# Patient Record
Sex: MALE | Race: WHITE | NOT HISPANIC OR LATINO | URBAN - METROPOLITAN AREA
[De-identification: names, ages, dates, MRNs, and addresses within clinical notes are randomized per-mention and may not be internally consistent; named-entity substitution may affect disease eponyms.]

---

## 2019-08-20 ENCOUNTER — EMERGENCY (EMERGENCY)
Facility: HOSPITAL | Age: 54
LOS: 0 days | Discharge: HOME | End: 2019-08-20
Attending: EMERGENCY MEDICINE | Admitting: EMERGENCY MEDICINE
Payer: COMMERCIAL

## 2019-08-20 VITALS
OXYGEN SATURATION: 98 % | SYSTOLIC BLOOD PRESSURE: 130 MMHG | HEART RATE: 97 BPM | TEMPERATURE: 98 F | DIASTOLIC BLOOD PRESSURE: 77 MMHG

## 2019-08-20 DIAGNOSIS — L03.116 CELLULITIS OF LEFT LOWER LIMB: ICD-10-CM

## 2019-08-20 DIAGNOSIS — R50.9 FEVER, UNSPECIFIED: ICD-10-CM

## 2019-08-20 DIAGNOSIS — M79.89 OTHER SPECIFIED SOFT TISSUE DISORDERS: ICD-10-CM

## 2019-08-20 LAB
ALBUMIN SERPL ELPH-MCNC: 4 G/DL — SIGNIFICANT CHANGE UP (ref 3.5–5.2)
ALP SERPL-CCNC: 72 U/L — SIGNIFICANT CHANGE UP (ref 30–115)
ALT FLD-CCNC: 22 U/L — SIGNIFICANT CHANGE UP (ref 0–41)
ANION GAP SERPL CALC-SCNC: 14 MMOL/L — SIGNIFICANT CHANGE UP (ref 7–14)
APPEARANCE UR: CLEAR — SIGNIFICANT CHANGE UP
AST SERPL-CCNC: 26 U/L — SIGNIFICANT CHANGE UP (ref 0–41)
BACTERIA # UR AUTO: ABNORMAL
BASOPHILS # BLD AUTO: 0.04 K/UL — SIGNIFICANT CHANGE UP (ref 0–0.2)
BASOPHILS NFR BLD AUTO: 0.4 % — SIGNIFICANT CHANGE UP (ref 0–1)
BILIRUB SERPL-MCNC: 0.7 MG/DL — SIGNIFICANT CHANGE UP (ref 0.2–1.2)
BILIRUB UR-MCNC: NEGATIVE — SIGNIFICANT CHANGE UP
BUN SERPL-MCNC: 15 MG/DL — SIGNIFICANT CHANGE UP (ref 10–20)
CALCIUM SERPL-MCNC: 8.9 MG/DL — SIGNIFICANT CHANGE UP (ref 8.5–10.1)
CHLORIDE SERPL-SCNC: 100 MMOL/L — SIGNIFICANT CHANGE UP (ref 98–110)
CO2 SERPL-SCNC: 21 MMOL/L — SIGNIFICANT CHANGE UP (ref 17–32)
COLOR SPEC: YELLOW — SIGNIFICANT CHANGE UP
CREAT SERPL-MCNC: 0.9 MG/DL — SIGNIFICANT CHANGE UP (ref 0.7–1.5)
DIFF PNL FLD: ABNORMAL
EOSINOPHIL # BLD AUTO: 0.09 K/UL — SIGNIFICANT CHANGE UP (ref 0–0.7)
EOSINOPHIL NFR BLD AUTO: 0.9 % — SIGNIFICANT CHANGE UP (ref 0–8)
EPI CELLS # UR: 1 /HPF — SIGNIFICANT CHANGE UP (ref 0–5)
GLUCOSE SERPL-MCNC: 276 MG/DL — HIGH (ref 70–99)
GLUCOSE UR QL: ABNORMAL
HCT VFR BLD CALC: 41.8 % — LOW (ref 42–52)
HGB BLD-MCNC: 14 G/DL — SIGNIFICANT CHANGE UP (ref 14–18)
HYALINE CASTS # UR AUTO: 0 /LPF — SIGNIFICANT CHANGE UP (ref 0–7)
IMM GRANULOCYTES NFR BLD AUTO: 0.5 % — HIGH (ref 0.1–0.3)
KETONES UR-MCNC: SIGNIFICANT CHANGE UP
LACTATE SERPL-SCNC: 1.4 MMOL/L — SIGNIFICANT CHANGE UP (ref 0.5–2.2)
LEUKOCYTE ESTERASE UR-ACNC: ABNORMAL
LYMPHOCYTES # BLD AUTO: 1.09 K/UL — LOW (ref 1.2–3.4)
LYMPHOCYTES # BLD AUTO: 10.5 % — LOW (ref 20.5–51.1)
MCHC RBC-ENTMCNC: 30.1 PG — SIGNIFICANT CHANGE UP (ref 27–31)
MCHC RBC-ENTMCNC: 33.5 G/DL — SIGNIFICANT CHANGE UP (ref 32–37)
MCV RBC AUTO: 89.9 FL — SIGNIFICANT CHANGE UP (ref 80–94)
MONOCYTES # BLD AUTO: 0.91 K/UL — HIGH (ref 0.1–0.6)
MONOCYTES NFR BLD AUTO: 8.7 % — SIGNIFICANT CHANGE UP (ref 1.7–9.3)
NEUTROPHILS # BLD AUTO: 8.24 K/UL — HIGH (ref 1.4–6.5)
NEUTROPHILS NFR BLD AUTO: 79 % — HIGH (ref 42.2–75.2)
NITRITE UR-MCNC: NEGATIVE — SIGNIFICANT CHANGE UP
NRBC # BLD: 0 /100 WBCS — SIGNIFICANT CHANGE UP (ref 0–0)
PH UR: 6.5 — SIGNIFICANT CHANGE UP (ref 5–8)
PLATELET # BLD AUTO: 150 K/UL — SIGNIFICANT CHANGE UP (ref 130–400)
POTASSIUM SERPL-MCNC: 5.2 MMOL/L — HIGH (ref 3.5–5)
POTASSIUM SERPL-SCNC: 5.2 MMOL/L — HIGH (ref 3.5–5)
PROT SERPL-MCNC: 7.1 G/DL — SIGNIFICANT CHANGE UP (ref 6–8)
PROT UR-MCNC: ABNORMAL
RBC # BLD: 4.65 M/UL — LOW (ref 4.7–6.1)
RBC # FLD: 12.8 % — SIGNIFICANT CHANGE UP (ref 11.5–14.5)
RBC CASTS # UR COMP ASSIST: 9 /HPF — HIGH (ref 0–4)
SODIUM SERPL-SCNC: 135 MMOL/L — SIGNIFICANT CHANGE UP (ref 135–146)
SP GR SPEC: 1.03 — HIGH (ref 1.01–1.02)
UROBILINOGEN FLD QL: SIGNIFICANT CHANGE UP
WBC # BLD: 10.42 K/UL — SIGNIFICANT CHANGE UP (ref 4.8–10.8)
WBC # FLD AUTO: 10.42 K/UL — SIGNIFICANT CHANGE UP (ref 4.8–10.8)
WBC UR QL: 70 /HPF — HIGH (ref 0–5)

## 2019-08-20 PROCEDURE — 99284 EMERGENCY DEPT VISIT MOD MDM: CPT

## 2019-08-20 PROCEDURE — 73590 X-RAY EXAM OF LOWER LEG: CPT | Mod: 26,LT

## 2019-08-20 RX ORDER — CEPHALEXIN 500 MG
1 CAPSULE ORAL
Qty: 40 | Refills: 0
Start: 2019-08-20 | End: 2019-08-29

## 2019-08-20 RX ORDER — PIPERACILLIN AND TAZOBACTAM 4; .5 G/20ML; G/20ML
4.5 INJECTION, POWDER, LYOPHILIZED, FOR SOLUTION INTRAVENOUS ONCE
Refills: 0 | Status: COMPLETED | OUTPATIENT
Start: 2019-08-20 | End: 2019-08-20

## 2019-08-20 RX ADMIN — PIPERACILLIN AND TAZOBACTAM 25 GRAM(S): 4; .5 INJECTION, POWDER, LYOPHILIZED, FOR SOLUTION INTRAVENOUS at 12:03

## 2019-08-20 NOTE — ED PROVIDER NOTE - ATTENDING CONTRIBUTION TO CARE
52 yo M with PMH of DM presents to Ed for L erythematous calf since Sunday. Patient states it is painful and erythematous. He had this once in the past when he had cellulitis. Subjective fever yesterday but no fever or chills today. No swelling. Patient able to ambulate.     On PE patient has erythematous L calf. No gas or crepitus felt. Patient has DP and TP pulses. No swelling. No fever.     Will do basic labs and xray and give antibiotics.

## 2019-08-20 NOTE — ED PROVIDER NOTE - CLINICAL SUMMARY MEDICAL DECISION MAKING FREE TEXT BOX
Cellulitis    Cellulitis is a skin infection caused by bacteria. This condition occurs most often in the arms and lower legs but can occur anywhere over the body. Symptoms include redness, swelling, warm skin, tenderness, and chills/fever. If you were prescribed an antibiotic medicine, take it as told by your health care provider. Do not stop taking the antibiotic even if you start to feel better.    SEEK IMMEDIATE MEDICAL CARE IF YOU HAVE ANY OF THE FOLLOWING SYMPTOMS: worsening fever, red streaks coming from affected area, vomiting or diarrhea, or dizziness/lightheadedness. 52 yo M with DM presents to ED for L calf erythema concerning for cellulitis. Patient's labs and vitals are WNL. No fever. No crepitus. xrya normal. Patient able to ambulate .  DC home with PO antibiotics and strict return precautions. Patient understands and will fu with PCP unless his leg worsens- erythema spreads, he develops fever or chills.

## 2019-08-20 NOTE — ED PROVIDER NOTE - OBJECTIVE STATEMENT
pt is a 52 yo with hx of diabetes, chronic venous stasis x 10 years,  presenting with pain and swelling since Sunday .   Per patient on sunday he first noticed a small area of redness and pain on his lower left calf.  It has continued to progress and when he woke up today he noticed the entire leg was red , warm,  painful to touch and mildly swollen.  He is also reporting a subjective fever, 101 on home thermometer . Took Advil at 10pm last night. Of note has had a previous episode of cellulitis " many years ago " and took PO antibiotics at home.  Afebrile on arrival today.

## 2019-08-20 NOTE — ED PROVIDER NOTE - PHYSICAL EXAMINATION
HEAD:  normocephalic, atraumatic  EYES:  conjunctivae without injection, drainage or discharge  ENMT:  tympanic membranes pearly gray with normal landmarks; nasal mucosa moist; mouth moist without ulcerations or lesions; throat moist without erythema, exudate, ulcerations or lesions  NECK:  supple, no masses, no significant lymphadenopathy  CARDIAC:  regular rate and rhythm, normal S1 and S2, no murmurs, rubs or gallops  RESP:  respiratory rate and effort appear normal for age; lungs are clear to auscultation bilaterally; no rales or wheezes  ABDOMEN:  soft, nontender, nondistended, no masses, no organomegaly  LYMPHATICS:  no significant lymphadenopathy  MUSCULOSKELETAL/NEURO:  normal movement, normal tone  SKIN:

## 2019-08-20 NOTE — ED PROVIDER NOTE - NS ED ROS FT
Constitutional: subjective fever at home  Eyes/ENT: (-) blurry vision  Cardiovascular: (-) chest pain,  Respiratory:  (-) shortness of breath  Gastrointestinal: (-) vomiting, (-) diarrhea, (-) abdominal pain   Musculoskeletal: (-) neck pain, (-) back pain, (-) joint pain  Integumentary: (-) rash, (-) edema  Neurological: (-) headache, (-) altered mental status  Psychiatric: (-) hallucinations  Allergic/Immunologic: (-) pruritus around site

## 2019-08-21 RX ORDER — CEPHALEXIN 500 MG
1 CAPSULE ORAL
Qty: 40 | Refills: 0
Start: 2019-08-21 | End: 2019-08-30

## 2019-08-25 ENCOUNTER — INPATIENT (INPATIENT)
Facility: HOSPITAL | Age: 54
LOS: 2 days | Discharge: HOME | End: 2019-08-28
Attending: INTERNAL MEDICINE | Admitting: INTERNAL MEDICINE
Payer: COMMERCIAL

## 2019-08-25 VITALS
RESPIRATION RATE: 16 BRPM | DIASTOLIC BLOOD PRESSURE: 60 MMHG | SYSTOLIC BLOOD PRESSURE: 122 MMHG | OXYGEN SATURATION: 97 % | TEMPERATURE: 98 F | WEIGHT: 315 LBS | HEART RATE: 98 BPM | HEIGHT: 73 IN

## 2019-08-25 LAB
ALBUMIN SERPL ELPH-MCNC: 4.3 G/DL — SIGNIFICANT CHANGE UP (ref 3.5–5.2)
ALP SERPL-CCNC: 69 U/L — SIGNIFICANT CHANGE UP (ref 30–115)
ALT FLD-CCNC: 37 U/L — SIGNIFICANT CHANGE UP (ref 0–41)
ANION GAP SERPL CALC-SCNC: 13 MMOL/L — SIGNIFICANT CHANGE UP (ref 7–14)
AST SERPL-CCNC: 25 U/L — SIGNIFICANT CHANGE UP (ref 0–41)
BASOPHILS # BLD AUTO: 0.04 K/UL — SIGNIFICANT CHANGE UP (ref 0–0.2)
BASOPHILS NFR BLD AUTO: 0.4 % — SIGNIFICANT CHANGE UP (ref 0–1)
BILIRUB SERPL-MCNC: 0.3 MG/DL — SIGNIFICANT CHANGE UP (ref 0.2–1.2)
BUN SERPL-MCNC: 24 MG/DL — HIGH (ref 10–20)
CALCIUM SERPL-MCNC: 9.4 MG/DL — SIGNIFICANT CHANGE UP (ref 8.5–10.1)
CHLORIDE SERPL-SCNC: 99 MMOL/L — SIGNIFICANT CHANGE UP (ref 98–110)
CO2 SERPL-SCNC: 25 MMOL/L — SIGNIFICANT CHANGE UP (ref 17–32)
CREAT SERPL-MCNC: 1.1 MG/DL — SIGNIFICANT CHANGE UP (ref 0.7–1.5)
CULTURE RESULTS: SIGNIFICANT CHANGE UP
CULTURE RESULTS: SIGNIFICANT CHANGE UP
EOSINOPHIL # BLD AUTO: 0.27 K/UL — SIGNIFICANT CHANGE UP (ref 0–0.7)
EOSINOPHIL NFR BLD AUTO: 2.7 % — SIGNIFICANT CHANGE UP (ref 0–8)
GLUCOSE BLDC GLUCOMTR-MCNC: 146 MG/DL — HIGH (ref 70–99)
GLUCOSE BLDC GLUCOMTR-MCNC: 162 MG/DL — HIGH (ref 70–99)
GLUCOSE SERPL-MCNC: 200 MG/DL — HIGH (ref 70–99)
HCT VFR BLD CALC: 43.1 % — SIGNIFICANT CHANGE UP (ref 42–52)
HGB BLD-MCNC: 14.2 G/DL — SIGNIFICANT CHANGE UP (ref 14–18)
IMM GRANULOCYTES NFR BLD AUTO: 0.9 % — HIGH (ref 0.1–0.3)
LACTATE SERPL-SCNC: 1.2 MMOL/L — SIGNIFICANT CHANGE UP (ref 0.5–2.2)
LYMPHOCYTES # BLD AUTO: 1.92 K/UL — SIGNIFICANT CHANGE UP (ref 1.2–3.4)
LYMPHOCYTES # BLD AUTO: 19.2 % — LOW (ref 20.5–51.1)
MCHC RBC-ENTMCNC: 29.8 PG — SIGNIFICANT CHANGE UP (ref 27–31)
MCHC RBC-ENTMCNC: 32.9 G/DL — SIGNIFICANT CHANGE UP (ref 32–37)
MCV RBC AUTO: 90.5 FL — SIGNIFICANT CHANGE UP (ref 80–94)
MONOCYTES # BLD AUTO: 0.72 K/UL — HIGH (ref 0.1–0.6)
MONOCYTES NFR BLD AUTO: 7.2 % — SIGNIFICANT CHANGE UP (ref 1.7–9.3)
NEUTROPHILS # BLD AUTO: 6.98 K/UL — HIGH (ref 1.4–6.5)
NEUTROPHILS NFR BLD AUTO: 69.6 % — SIGNIFICANT CHANGE UP (ref 42.2–75.2)
NRBC # BLD: 0 /100 WBCS — SIGNIFICANT CHANGE UP (ref 0–0)
PLATELET # BLD AUTO: 242 K/UL — SIGNIFICANT CHANGE UP (ref 130–400)
POTASSIUM SERPL-MCNC: 5.3 MMOL/L — HIGH (ref 3.5–5)
POTASSIUM SERPL-SCNC: 5.3 MMOL/L — HIGH (ref 3.5–5)
PROT SERPL-MCNC: 7.5 G/DL — SIGNIFICANT CHANGE UP (ref 6–8)
RBC # BLD: 4.76 M/UL — SIGNIFICANT CHANGE UP (ref 4.7–6.1)
RBC # FLD: 12.1 % — SIGNIFICANT CHANGE UP (ref 11.5–14.5)
SODIUM SERPL-SCNC: 137 MMOL/L — SIGNIFICANT CHANGE UP (ref 135–146)
SPECIMEN SOURCE: SIGNIFICANT CHANGE UP
SPECIMEN SOURCE: SIGNIFICANT CHANGE UP
WBC # BLD: 10.02 K/UL — SIGNIFICANT CHANGE UP (ref 4.8–10.8)
WBC # FLD AUTO: 10.02 K/UL — SIGNIFICANT CHANGE UP (ref 4.8–10.8)

## 2019-08-25 PROCEDURE — 99285 EMERGENCY DEPT VISIT HI MDM: CPT

## 2019-08-25 PROCEDURE — 73590 X-RAY EXAM OF LOWER LEG: CPT | Mod: 26,LT

## 2019-08-25 PROCEDURE — 93971 EXTREMITY STUDY: CPT | Mod: 26,LT

## 2019-08-25 RX ORDER — ENOXAPARIN SODIUM 100 MG/ML
40 INJECTION SUBCUTANEOUS DAILY
Refills: 0 | Status: DISCONTINUED | OUTPATIENT
Start: 2019-08-25 | End: 2019-08-27

## 2019-08-25 RX ORDER — LISINOPRIL 2.5 MG/1
20 TABLET ORAL DAILY
Refills: 0 | Status: DISCONTINUED | OUTPATIENT
Start: 2019-08-25 | End: 2019-08-25

## 2019-08-25 RX ORDER — METRONIDAZOLE 500 MG
500 TABLET ORAL ONCE
Refills: 0 | Status: COMPLETED | OUTPATIENT
Start: 2019-08-25 | End: 2019-08-25

## 2019-08-25 RX ORDER — MORPHINE SULFATE 50 MG/1
4 CAPSULE, EXTENDED RELEASE ORAL ONCE
Refills: 0 | Status: DISCONTINUED | OUTPATIENT
Start: 2019-08-25 | End: 2019-08-25

## 2019-08-25 RX ORDER — SODIUM CHLORIDE 9 MG/ML
1000 INJECTION, SOLUTION INTRAVENOUS ONCE
Refills: 0 | Status: COMPLETED | OUTPATIENT
Start: 2019-08-25 | End: 2019-08-25

## 2019-08-25 RX ORDER — CEFTRIAXONE 500 MG/1
2000 INJECTION, POWDER, FOR SOLUTION INTRAMUSCULAR; INTRAVENOUS ONCE
Refills: 0 | Status: COMPLETED | OUTPATIENT
Start: 2019-08-25 | End: 2019-08-25

## 2019-08-25 RX ADMIN — Medication 100 MILLIGRAM(S): at 21:07

## 2019-08-25 RX ADMIN — CEFTRIAXONE 2000 MILLIGRAM(S): 500 INJECTION, POWDER, FOR SOLUTION INTRAMUSCULAR; INTRAVENOUS at 08:30

## 2019-08-25 RX ADMIN — SODIUM CHLORIDE 2000 MILLILITER(S): 9 INJECTION, SOLUTION INTRAVENOUS at 07:55

## 2019-08-25 RX ADMIN — MORPHINE SULFATE 4 MILLIGRAM(S): 50 CAPSULE, EXTENDED RELEASE ORAL at 08:10

## 2019-08-25 RX ADMIN — Medication 100 MILLIGRAM(S): at 08:33

## 2019-08-25 RX ADMIN — CEFTRIAXONE 100 MILLIGRAM(S): 500 INJECTION, POWDER, FOR SOLUTION INTRAMUSCULAR; INTRAVENOUS at 07:55

## 2019-08-25 RX ADMIN — SODIUM CHLORIDE 1000 MILLILITER(S): 9 INJECTION, SOLUTION INTRAVENOUS at 08:45

## 2019-08-25 RX ADMIN — Medication 100 MILLIGRAM(S): at 15:19

## 2019-08-25 RX ADMIN — Medication 500 MILLIGRAM(S): at 09:48

## 2019-08-25 RX ADMIN — ENOXAPARIN SODIUM 40 MILLIGRAM(S): 100 INJECTION SUBCUTANEOUS at 15:18

## 2019-08-25 RX ADMIN — MORPHINE SULFATE 4 MILLIGRAM(S): 50 CAPSULE, EXTENDED RELEASE ORAL at 07:55

## 2019-08-25 NOTE — H&P ADULT - HISTORY OF PRESENT ILLNESS
53 year old male with history of DM, HTN on Glipizide presents to ED for worsening leg swelling and pain after being treated for cellulitis 1 week ago with Keflex.   patient was doing well until one week prior to presentation when he started to have pain, redness in the left leg associated with fever of 101 at home, was seen in ED on Tuesday and discharged on keflex,  pain has been worsening since the, denies any fever since been discharged, no history of trauma, no chills, or limited joint movement.  In ED: /60 , Heart Rate98 /min, RR 16 /min, Temp (F)98.1 Degrees F SpO2 (%)97 % on RA. Intact pulses. was admitted for treatment of cellulitis after failure of outpatient treatment.

## 2019-08-25 NOTE — ED PROVIDER NOTE - CLINICAL SUMMARY MEDICAL DECISION MAKING FREE TEXT BOX
54 y/o diabetic male presented to ED with cellulitis to RLE, failing outpt therapy. Labs and imaging obtained, no concern for deep tissue infection. No DVT on prelim duplex. Abx given. Admitted to medicine for further management. Pt stable upon admission.

## 2019-08-25 NOTE — H&P ADULT - NSICDXPASTMEDICALHX_GEN_ALL_CORE_FT
PAST MEDICAL HISTORY:  Appendicitis     Central cord syndrome     Diabetes     Hypertension     Kidney disease

## 2019-08-25 NOTE — ED PROVIDER NOTE - ATTENDING MD FREE TEXT FOR MDM DISCUSSED CASE WITH QUESTION
Pt's wife (Reyna) called this morning, she is wondering if pt needs to take anything before he see his eye doctor today, wife denies pt having any procedure done, per wife they will just check his eye for possible cataract surgery, informed wife that pt does not need pre-med for eye appointment, and if eye doctor need a cardiac clearance prior to pt's cataract procedure, informed wife to request for the cardiac clearance to be faxed to our office, office fax number provided to wife. Wife appreciative and verbalizes understanding   medicine

## 2019-08-25 NOTE — ED PROVIDER NOTE - NS ED ROS FT
Review of Systems:  •	CONSTITUTIONAL - No fever, No diaphoresis, No weight change  •	SKIN - No rash  •	HEMATOLOGIC - No abnormal bleeding or bruising  •	EYES - No eye pain, No blurred vision  •	ENT - No change in hearing, No sore throat, No neck pain, No rhinorrhea, No ear pain  •	RESPIRATORY - No shortness of breath, No cough  •	CARDIAC -No chest pain, No palpitations  •	GI - No abdominal pain, No nausea, No vomiting, No diarrhea, No constipation, No bright red blood per rectum or melena. No flank pain  •                 - No dysuria, frequency, hematuria.   •	ENDO - No polydypsia, No polyuria, No heat/cold intolerance  •	MUSCULOSKELETAL - + cellulitis to the LLE, otherwise No joint paint, No swelling, No back pain  •	NEUROLOGIC - No numbness, No focal weakness, No headache, No dizziness  All other systems negative, unless specified in HPI

## 2019-08-25 NOTE — H&P ADULT - NSHPPHYSICALEXAM_GEN_ALL_CORE
PHYSICAL EXAM:  GENERAL: NAD, lying in bed comfortably  HEAD:  Atraumatic, Normocephalic  EYES: EOMI, PERRLA, conjunctiva and sclera clear  ENT: Moist mucous membranes  NECK: Supple, No JVD  CHEST/LUNG: Clear to auscultation bilaterally; No rales, rhonchi, wheezing, or rubs. Unlabored respirations  HEART: Regular rate and rhythm; No murmurs, rubs, or gallops  ABDOMEN: Bowel sounds present; Soft, Nontender, Nondistended. No hepatomegally  EXTREMITIES:  2+ Peripheral Pulses,  left leg swelling, redness, tenderness, venous insufficiency skin changes bilateral   NERVOUS SYSTEM:  Alert & Oriented X3, speech clear. No deficits   MSK: FROM all 4 extremities, full and equal strength

## 2019-08-25 NOTE — ED ADULT TRIAGE NOTE - CHIEF COMPLAINT QUOTE
Pt co swelling to left foot/ankle/calf. pt was here tuesday, diagnosed with cellulitis, pw worsening swelling and pain Pt co swelling to LLE. pt was here tuesday, diagnosed with cellulitis, pw worsening swelling and pain. swelling redness and warmth noted to LLE

## 2019-08-25 NOTE — ED ADULT NURSE NOTE - OBJECTIVE STATEMENT
pt assessed, a&ox4, pt presents with LLE redness and swelling, diagnosed with cellulitis a few days ago but states swelling is worse today. able to DAMON, denies CP/SOB/N/V.

## 2019-08-25 NOTE — ED PROVIDER NOTE - ATTENDING CONTRIBUTION TO CARE
I personally evaluated the patient. I reviewed the Resident’s or Physician Assistant’s note (as assigned above), and agree with the findings and plan except as documented in my note.    Pt is a 54 y/o male with hx of DM who presents to ED for left lower leg pain, swelling, redness for 5 days, moderate, constant, worse since onset. No fever, chills, trauma, chest pain, SOB. Pt was seen here 4 days ago, had labs, XR done. Pt currently on keflex but sx's are continuing.     Constitutional: Well developed, well nourished. NAD.  Head: Normocephalic, atraumatic.  Eyes: PERRL. EOMI.  ENT: No nasal discharge. Mucous membranes moist.  Neck: Supple. Painless ROM.  Cardiovascular: Normal S1, S2. Regular rate and rhythm. No murmurs, rubs, or gallops.  Pulmonary: Normal respiratory rate and effort. Lungs clear to auscultation bilaterally. No wheezing, rales, or rhonchi.  Abdominal: Soft. Nondistended. Nontender. No rebound, guarding, rigidity.  Extremities. Pelvis stable. + left lower leg redness, swelling, tenderness. NVI distally. No crepitus.  Skin: No rashes, cyanosis.  Neuro: AAOx3. No focal neurological deficits.  Psych: Normal mood. Normal affect.

## 2019-08-25 NOTE — ED ADULT NURSE NOTE - INTERVENTIONS DEFINITIONS
Physically safe environment: no spills, clutter or unnecessary equipment/Stretcher in lowest position, wheels locked, appropriate side rails in place/Room bathroom lighting operational/Non-slip footwear when patient is off stretcher/Review medications for side effects contributing to fall risk/Reinforce activity limits and safety measures with patient and family/Call bell, personal items and telephone within reach/Provide visual cue, wrist band, yellow gown, etc./Monitor gait and stability/Gore to call system/Instruct patient to call for assistance

## 2019-08-25 NOTE — H&P ADULT - ASSESSMENT
53 year old male with history of HTN, DM on Glipizide presents to ED for worsening leg swelling and pain after being treated for cellulitis 1 week ago with Keflex.     # Left leg cellulitis, failed outpatient treatment  - no sepsis afebrile   - IV clindamycin 600 Q8     # DM on Glipizide at home   - FS, start basal bolus if >180     # HTN   c/W ramipril > lisinopril     # DVT PPX  # full code  # DASH diet  # dispo: home in 24-48 hours 53 year old male with history of HTN, DM on Glipizide presents to ED for worsening leg swelling and pain after being treated for cellulitis 1 week ago with Keflex.     # Left leg cellulitis, failed outpatient treatment  - worsening pain and swelling  - no sepsis afebrile   - IV clindamycin 600 Q8 .  - Xray   - Duplex negative,    # DM on Glipizide at home   - FS, start basal bolus if >180     # HTN   c/W ramipril > lisinopril     # DVT PPX  # full code  # DASH diet  # dispo: home in 24-48 hours

## 2019-08-25 NOTE — PHARMACOTHERAPY INTERVENTION NOTE - COMMENTS
I spoke with Dr. Toht [id number] and told him that the K+ is elevated and that the lisinopril can increase it more ,he said he will hold the lisninopril

## 2019-08-25 NOTE — ED PROVIDER NOTE - OBJECTIVE STATEMENT
52 y/o male with PMH of DM who presents to ED for infections of the LLE. Pt was seen in this ED 1 week ago, was treated with Keflex. PT has been taking medications prescribed with continued pain swelling warmth and redness ot the left calf. No fever or chills.

## 2019-08-25 NOTE — H&P ADULT - NSHPLABSRESULTS_GEN_ALL_CORE
14.2   10.02 )-----------( 242      ( 25 Aug 2019 07:22 )             43.1       08-25    137  |  99  |  24<H>  ----------------------------<  200<H>  5.3<H>   |  25  |  1.1    Ca    9.4      25 Aug 2019 07:22    TPro  7.5  /  Alb  4.3  /  TBili  0.3  /  DBili  x   /  AST  25  /  ALT  37  /  AlkPhos  69  08-25                      Lactate Trend  08-25 @ 08:05 Lactate:1.2             CAPILLARY BLOOD GLUCOSE      POCT Blood Glucose.: 162 mg/dL (25 Aug 2019 11:18)        Culture Results:   No growth to date. (08-20 @ 11:03)  Culture Results:   No growth to date. (08-20 @ 11:03)      < from: [facility] Duplex Lower Ext Vein Scan, Left (08.25.19 @ 08:59) >      No evidence of deep venous thrombosis or superficial thrombophlebitis in   left lower extremity.

## 2019-08-25 NOTE — ED PROVIDER NOTE - PHYSICAL EXAMINATION
CONSTITUTIONAL: Well-developed; well-nourished; in no acute distress.   SKIN: warm, dry  HEAD: Normocephalic; atraumatic.  EYES: no conjunctival injection. PERRL.   ENT: No nasal discharge; airway clear.  NECK: Supple; non tender.  CARD: S1, S2 normal; no murmurs, gallops, or rubs. Regular rate and rhythm.   RESP: No wheezes, rales or rhonchi.  ABD: soft ntnd  EXT: + erythema warmth swelling and ttp over the LLE. Otherwise, Normal ROM.  No clubbing, cyanosis or edema.   LYMPH: No acute cervical adenopathy.  NEURO: Alert, oriented, grossly unremarkable  PSYCH: Cooperative, appropriate.

## 2019-08-26 LAB
ANION GAP SERPL CALC-SCNC: 14 MMOL/L — SIGNIFICANT CHANGE UP (ref 7–14)
BASOPHILS # BLD AUTO: 0.04 K/UL — SIGNIFICANT CHANGE UP (ref 0–0.2)
BASOPHILS NFR BLD AUTO: 0.5 % — SIGNIFICANT CHANGE UP (ref 0–1)
BUN SERPL-MCNC: 17 MG/DL — SIGNIFICANT CHANGE UP (ref 10–20)
CALCIUM SERPL-MCNC: 9.6 MG/DL — SIGNIFICANT CHANGE UP (ref 8.5–10.1)
CHLORIDE SERPL-SCNC: 98 MMOL/L — SIGNIFICANT CHANGE UP (ref 98–110)
CO2 SERPL-SCNC: 26 MMOL/L — SIGNIFICANT CHANGE UP (ref 17–32)
CREAT SERPL-MCNC: 1.1 MG/DL — SIGNIFICANT CHANGE UP (ref 0.7–1.5)
EOSINOPHIL # BLD AUTO: 0.17 K/UL — SIGNIFICANT CHANGE UP (ref 0–0.7)
EOSINOPHIL NFR BLD AUTO: 2 % — SIGNIFICANT CHANGE UP (ref 0–8)
ERYTHROCYTE [SEDIMENTATION RATE] IN BLOOD: 61 MM/HR — HIGH (ref 0–10)
GLUCOSE BLDC GLUCOMTR-MCNC: 147 MG/DL — HIGH (ref 70–99)
GLUCOSE BLDC GLUCOMTR-MCNC: 167 MG/DL — HIGH (ref 70–99)
GLUCOSE BLDC GLUCOMTR-MCNC: 181 MG/DL — HIGH (ref 70–99)
GLUCOSE BLDC GLUCOMTR-MCNC: 182 MG/DL — HIGH (ref 70–99)
GLUCOSE SERPL-MCNC: 169 MG/DL — HIGH (ref 70–99)
HCT VFR BLD CALC: 43.7 % — SIGNIFICANT CHANGE UP (ref 42–52)
HGB BLD-MCNC: 14.4 G/DL — SIGNIFICANT CHANGE UP (ref 14–18)
IMM GRANULOCYTES NFR BLD AUTO: 0.8 % — HIGH (ref 0.1–0.3)
LYMPHOCYTES # BLD AUTO: 1.95 K/UL — SIGNIFICANT CHANGE UP (ref 1.2–3.4)
LYMPHOCYTES # BLD AUTO: 22.6 % — SIGNIFICANT CHANGE UP (ref 20.5–51.1)
MCHC RBC-ENTMCNC: 30 PG — SIGNIFICANT CHANGE UP (ref 27–31)
MCHC RBC-ENTMCNC: 33 G/DL — SIGNIFICANT CHANGE UP (ref 32–37)
MCV RBC AUTO: 91 FL — SIGNIFICANT CHANGE UP (ref 80–94)
MONOCYTES # BLD AUTO: 0.68 K/UL — HIGH (ref 0.1–0.6)
MONOCYTES NFR BLD AUTO: 7.9 % — SIGNIFICANT CHANGE UP (ref 1.7–9.3)
NEUTROPHILS # BLD AUTO: 5.71 K/UL — SIGNIFICANT CHANGE UP (ref 1.4–6.5)
NEUTROPHILS NFR BLD AUTO: 66.2 % — SIGNIFICANT CHANGE UP (ref 42.2–75.2)
NRBC # BLD: 0 /100 WBCS — SIGNIFICANT CHANGE UP (ref 0–0)
PLATELET # BLD AUTO: 265 K/UL — SIGNIFICANT CHANGE UP (ref 130–400)
POTASSIUM SERPL-MCNC: 5.5 MMOL/L — HIGH (ref 3.5–5)
POTASSIUM SERPL-SCNC: 5.5 MMOL/L — HIGH (ref 3.5–5)
RBC # BLD: 4.8 M/UL — SIGNIFICANT CHANGE UP (ref 4.7–6.1)
RBC # FLD: 12.2 % — SIGNIFICANT CHANGE UP (ref 11.5–14.5)
SODIUM SERPL-SCNC: 138 MMOL/L — SIGNIFICANT CHANGE UP (ref 135–146)
WBC # BLD: 8.62 K/UL — SIGNIFICANT CHANGE UP (ref 4.8–10.8)
WBC # FLD AUTO: 8.62 K/UL — SIGNIFICANT CHANGE UP (ref 4.8–10.8)

## 2019-08-26 PROCEDURE — 99232 SBSQ HOSP IP/OBS MODERATE 35: CPT

## 2019-08-26 RX ADMIN — Medication 100 MILLIGRAM(S): at 13:01

## 2019-08-26 RX ADMIN — Medication 100 MILLIGRAM(S): at 05:08

## 2019-08-26 RX ADMIN — Medication 100 MILLIGRAM(S): at 22:11

## 2019-08-26 RX ADMIN — ENOXAPARIN SODIUM 40 MILLIGRAM(S): 100 INJECTION SUBCUTANEOUS at 11:02

## 2019-08-26 NOTE — PROGRESS NOTE ADULT - SUBJECTIVE AND OBJECTIVE BOX
OCTAVIA GRAY 53y Male  MRN#: [id number]   CODE STATUS:________      SUBJECTIVE  Patient is a 53y old Male who presents with a chief complaint of left leg cellulitis (25 Aug 2019 12:34)  Currently admitted to medicine with the primary diagnosis of Cellulitis    Today is hospital day 1d, and this morning he is resting in bed, no acute events overnight. Patient admits that his LLE erythema and pain has improved since presentation, able to ambulate. Patient is on IV clindamycin currently. He denies any fevers, chills, sob, cp.           OBJECTIVE  PAST MEDICAL & SURGICAL HISTORY  Hypertension  Appendicitis  Kidney disease  Diabetes  Central cord syndrome    ALLERGIES:  No Known Allergies    MEDICATIONS:  STANDING MEDICATIONS  clindamycin IVPB 600 milliGRAM(s) IV Intermittent every 8 hours  enoxaparin Injectable 40 milliGRAM(s) SubCutaneous daily    PRN MEDICATIONS      VITAL SIGNS: Last 24 Hours  T(C): 36.9 (26 Aug 2019 12:36), Max: 36.9 (26 Aug 2019 12:36)  T(F): 98.4 (26 Aug 2019 12:36), Max: 98.4 (26 Aug 2019 12:36)  HR: 77 (26 Aug 2019 12:36) (71 - 77)  BP: 115/79 (26 Aug 2019 12:36) (111/58 - 123/68)  BP(mean): --  RR: 18 (26 Aug 2019 12:36) (18 - 18)  SpO2: --    LABS:                        14.4   8.62  )-----------( 265      ( 26 Aug 2019 04:30 )             43.7     08-26    138  |  98  |  17  ----------------------------<  169<H>  5.5<H>   |  26  |  1.1    Ca    9.6      26 Aug 2019 04:30    TPro  7.5  /  Alb  4.3  /  TBili  0.3  /  DBili  x   /  AST  25  /  ALT  37  /  AlkPhos  69  08-25          Sedimentation Rate, Erythrocyte: 61 mm/Hr <H> (08-26-19 @ 04:30)          RADIOLOGY:      PHYSICAL EXAM:    GENERAL: NAD, obese, AAOx3  HEENT:  Atraumatic, Normocephalic. EOMI, PERRLA  PULMONARY: Clear to auscultation bilaterally; No wheeze  CARDIOVASCULAR: Regular rate and rhythm; No murmurs, rubs, or gallops  GASTROINTESTINAL: Soft, Nontender, Nondistended; BS+  MUSCULOSKELETAL:  2+ Peripheral Pulses, LLE stasis dermatitis, 1+ edema, warm to touch, mild tenderness to palpation with erythema; RLE chronic stasis changes. No purulence  NEUROLOGY: non-focal

## 2019-08-26 NOTE — PROGRESS NOTE ADULT - SUBJECTIVE AND OBJECTIVE BOX
SUBJECTIVE:   No new complaints    *********************** VITALS ******************************************  Vital Signs Last 24 Hrs  T(C): 36.9 (26 Aug 2019 12:36), Max: 36.9 (26 Aug 2019 12:36)  T(F): 98.4 (26 Aug 2019 12:36), Max: 98.4 (26 Aug 2019 12:36)  HR: 77 (26 Aug 2019 12:36) (71 - 77)  BP: 115/79 (26 Aug 2019 12:36) (111/58 - 123/68)  BP(mean): --  RR: 18 (26 Aug 2019 12:36) (18 - 18)  SpO2: --    ******************************** PHYSICAL EXAM:**************************************************  	GENERAL: NAD, lying in bed comfortably  	HEAD:  Atraumatic, Normocephalic  	EYES: EOMI, PERRLA, conjunctiva and sclera clear  	ENT: Moist mucous membranes  	NECK: Supple, No JVD  	CHEST/LUNG: Clear to auscultation bilaterally; No rales, rhonchi, wheezing, or rubs. Unlabored respirations  	HEART: Regular rate and rhythm; No murmurs, rubs, or gallops  	ABDOMEN: Bowel sounds present; Soft, Nontender, Nondistended. No hepatomegally  	EXTREMITIES:  2+ Peripheral Pulses,  left leg swelling, redness, tenderness, venous insufficiency skin changes bilateral   	NERVOUS SYSTEM:  Alert & Oriented X3, speech clear. No deficits               MSK: FROM all 4 extremities, full and equal strength                            14.4   8.62  )-----------( 265      ( 26 Aug 2019 04:30 )             43.7       08-26    138  |  98  |  17  ----------------------------<  169<H>  5.5<H>   |  26  |  1.1    Ca    9.6      26 Aug 2019 04:30    TPro  7.5  /  Alb  4.3  /  TBili  0.3  /  DBili  x   /  AST  25  /  ALT  37  /  AlkPhos  69  08-25      LIVER FUNCTIONS - ( 25 Aug 2019 07:22 )  Alb: 4.3 g/dL / Pro: 7.5 g/dL / ALK PHOS: 69 U/L / ALT: 37 U/L / AST: 25 U/L / GGT: x SUBJECTIVE:   . . . .    *********************** VITALS ******************************************  Vital Signs Last 24 Hrs  T(C): 36.9 (26 Aug 2019 12:36), Max: 36.9 (26 Aug 2019 12:36)  T(F): 98.4 (26 Aug 2019 12:36), Max: 98.4 (26 Aug 2019 12:36)  HR: 77 (26 Aug 2019 12:36) (71 - 77)  BP: 115/79 (26 Aug 2019 12:36) (111/58 - 123/68)  BP(mean): --  RR: 18 (26 Aug 2019 12:36) (18 - 18)  SpO2: --    ******************************** PHYSICAL EXAM:**************************************************  	GENERAL: NAD, lying in bed comfortably  	HEAD:  Atraumatic, Normocephalic  	EYES: EOMI, PERRLA, conjunctiva and sclera clear  	ENT: Moist mucous membranes  	NECK: Supple, No JVD  	CHEST/LUNG: Clear to auscultation bilaterally; No rales, rhonchi, wheezing, or rubs. Unlabored respirations  	HEART: Regular rate and rhythm; No murmurs, rubs, or gallops  	ABDOMEN: Bowel sounds present; Soft, Nontender, Nondistended. No hepatomegally  	EXTREMITIES:  2+ Peripheral Pulses,  left leg swelling, redness, tenderness, venous insufficiency skin changes bilateral   	NERVOUS SYSTEM:  Alert & Oriented X3, speech clear. No deficits               MSK: FROM all 4 extremities, full and equal strength        LABS:                     14.4   8.62  )-----------( 265      ( 26 Aug 2019 04:30 )             43.7       08-26    138  |  98  |  17  ----------------------------<  169<H>  5.5<H>   |  26  |  1.1    Ca    9.6      26 Aug 2019 04:30    TPro  7.5  /  Alb  4.3  /  TBili  0.3  /  DBili  x   /  AST  25  /  ALT  37  /  AlkPhos  69  08-25      LIVER FUNCTIONS - ( 25 Aug 2019 07:22 )  Alb: 4.3 g/dL / Pro: 7.5 g/dL / ALK PHOS: 69 U/L / ALT: 37 U/L / AST: 25 U/L / GGT: x             < from: [facility] Duplex Lower Ext Vein Scan, Left (08.25.19 @ 08:59) >  Impression:    No evidence of deep venous thrombosis or superficial thrombophlebitis in   left lower extremity.

## 2019-08-26 NOTE — PROGRESS NOTE ADULT - ASSESSMENT
53 year old male with history of HTN, DM on Glipizide presents to ED for worsening leg swelling and pain after being treated for cellulitis 1 week ago with Keflex.     # Left leg cellulitis, failed outpatient treatment  - worsening pain and swelling  - no sepsis afebrile   - IV clindamycin 600 Q8 .  - Xray   - Duplex negative,    # DM on Glipizide at home   - FS, start basal bolus if >180     # HTN   c/W ramipril > lisinopril         #Progress Note Handoff    Pending (specify):  Consults_________, Tests________, Test Results_______, Other_________    Family discussion:    Disposition: Home___/SNF___/Other________/Unknown at this time________ 53 year old male with history of HTN, DM on Glipizide presents to ED for worsening leg swelling and pain after being treated for cellulitis 1 week ago with Keflex.     # Left leg cellulitis, failed outpatient treatment   now improving on  IV clindamycin 600 Q8 .   cont current mgmt    # DM   on Glipizide at home   monitor FS      # HTN   c/W ramipril > lisinopril         #Progress Note Handoff    Pending (specify):  none    Family discussion: na    Disposition: Home_x__/SNF___/Other________/Unknown at this time________ 53 year old male with history of HTN, DM on Glipizide presents to ED for worsening leg swelling and pain after being treated for cellulitis 1 week ago with Keflex.     # Left leg cellulitis, failed outpatient treatment   # worsening BLE edema  now on IV clindamycin 600 Q8    cont current mgmt    # DM   on Glipizide at home   monitor FS      # HTN   c/W ramipril > lisinopril         #Progress Note Handoff    Pending (specify):  none    Family discussion: na    Disposition: Home_x__/SNF___/Other________/Unknown at this time________

## 2019-08-26 NOTE — PROGRESS NOTE ADULT - ASSESSMENT
53 year old male with history of HTN, DM on Glipizide presents to ED for worsening leg swelling and pain after being treated for cellulitis 1 week ago with Keflex admitted for LLE cellulitis     Left leg cellulitis, failed outpatient treatment  - pain improved since presentation  - no sepsis on admission, vitally stable   - c/w IV clindamycin 600 Q8, switch to PO upon discharte  - Xray negative for any osseous abnormalities  - Duplex negative for DVT      DM on Glipizide at home   - FS AC/HS, start basal bolus if >180     HTN   - lisinopril held due to hyperkalemia on admission  - bp currently stable, will monitor bmp    # DVT PPX: lovonox  # full code  # DASH diet  # dispo: anticipated for home discharge tomorrow

## 2019-08-26 NOTE — SBIRT NOTE ADULT - NSSBIRTALCPOSREINDET_GEN_A_CORE
Reinforced positive, health choices around current alcohol use, and educated patient about NIAAA low-risk drinking level and risks/harm reduction around excessive alcohol use.

## 2019-08-27 LAB
GLUCOSE BLDC GLUCOMTR-MCNC: 135 MG/DL — HIGH (ref 70–99)
GLUCOSE BLDC GLUCOMTR-MCNC: 144 MG/DL — HIGH (ref 70–99)
GLUCOSE BLDC GLUCOMTR-MCNC: 160 MG/DL — HIGH (ref 70–99)
GLUCOSE BLDC GLUCOMTR-MCNC: 190 MG/DL — HIGH (ref 70–99)

## 2019-08-27 PROCEDURE — 99232 SBSQ HOSP IP/OBS MODERATE 35: CPT

## 2019-08-27 PROCEDURE — 93971 EXTREMITY STUDY: CPT | Mod: 26,LT

## 2019-08-27 RX ORDER — RIVAROXABAN 15 MG-20MG
1 KIT ORAL
Qty: 42 | Refills: 0
Start: 2019-08-27 | End: 2019-09-16

## 2019-08-27 RX ORDER — RIVAROXABAN 15 MG-20MG
15 KIT ORAL
Refills: 0 | Status: DISCONTINUED | OUTPATIENT
Start: 2019-08-27 | End: 2019-08-28

## 2019-08-27 RX ORDER — APIXABAN 2.5 MG/1
1 TABLET, FILM COATED ORAL
Qty: 60 | Refills: 0
Start: 2019-08-27 | End: 2019-09-25

## 2019-08-27 RX ORDER — CEFAZOLIN SODIUM 1 G
2000 VIAL (EA) INJECTION EVERY 8 HOURS
Refills: 0 | Status: DISCONTINUED | OUTPATIENT
Start: 2019-08-27 | End: 2019-08-28

## 2019-08-27 RX ADMIN — Medication 100 MILLIGRAM(S): at 05:50

## 2019-08-27 RX ADMIN — Medication 100 MILLIGRAM(S): at 21:44

## 2019-08-27 RX ADMIN — RIVAROXABAN 15 MILLIGRAM(S): KIT at 11:17

## 2019-08-27 RX ADMIN — Medication 100 MILLIGRAM(S): at 13:11

## 2019-08-27 RX ADMIN — RIVAROXABAN 15 MILLIGRAM(S): KIT at 16:43

## 2019-08-27 NOTE — PROGRESS NOTE ADULT - SUBJECTIVE AND OBJECTIVE BOX
OCTAVIA GARY 53y Male  MRN#: [id number]   CODE STATUS:________      SUBJECTIVE  Patient is a 53y old Male who presents with a chief complaint of left leg cellulitis (26 Aug 2019 13:50)  Currently admitted to medicine with the primary diagnosis of Cellulitis    Today is hospital day 2d, and this morning he is still complaining of LLE pain upon ambulation and tightness. DVT study was done early am which was positive for Left popliteal vein DVT; patient was started on Xarelto 15 mg BID for DVT treatment. ID was consulted for his cellulitis not improving on IV Clindamycin Patient otherwise has been ambulating, denies any sob, cp, n/v or any voiding abnormalities.           OBJECTIVE  PAST MEDICAL & SURGICAL HISTORY  Hypertension  Appendicitis  Kidney disease  Diabetes  Central cord syndrome    ALLERGIES:  No Known Allergies    MEDICATIONS:  STANDING MEDICATIONS  clindamycin IVPB 600 milliGRAM(s) IV Intermittent every 8 hours  rivaroxaban 15 milliGRAM(s) Oral two times a day with meals    PRN MEDICATIONS      VITAL SIGNS: Last 24 Hours  T(C): 36.1 (27 Aug 2019 04:54), Max: 36.9 (26 Aug 2019 12:36)  T(F): 97 (27 Aug 2019 04:54), Max: 98.5 (26 Aug 2019 22:28)  HR: 73 (27 Aug 2019 04:54) (73 - 77)  BP: 127/59 (27 Aug 2019 04:54) (115/79 - 129/70)  BP(mean): --  RR: 18 (27 Aug 2019 04:54) (18 - 18)  SpO2: --    LABS:                        14.4   8.62  )-----------( 265      ( 26 Aug 2019 04:30 )             43.7     08-26    138  |  98  |  17  ----------------------------<  169<H>  5.5<H>   |  26  |  1.1    Ca    9.6      26 Aug 2019 04:30      Culture - Blood (collected 25 Aug 2019 10:15)  Source: .Blood Blood-Peripheral  Preliminary Report (26 Aug 2019 17:01):    No growth to date.    Culture - Blood (collected 25 Aug 2019 10:15)  Source: .Blood Blood-Peripheral  Preliminary Report (26 Aug 2019 17:01):    No growth to date.          PHYSICAL EXAM:    GENERAL: NAD, well-developed, AAOx3  HEENT:  Atraumatic, Normocephalic. EOMI, PERRLA,  PULMONARY: Clear to auscultation bilaterally; No wheeze  CARDIOVASCULAR: Regular rate and rhythm; No murmurs, rubs, or gallops  GASTROINTESTINAL: Soft, Nontender, Nondistended; Bowel sounds present  MUSCULOSKELETAL:  2+ Peripheral Pulses, LLE erythema, warm to touch, tender to palpation +stasis dermatitis, [place] +  NEUROLOGY: non-focal  SKIN: b/l LE stasis changes

## 2019-08-27 NOTE — PROGRESS NOTE ADULT - SUBJECTIVE AND OBJECTIVE BOX
SUBJECTIVE:   Patient was c/o ongoing pain in lower left leg.      *********************** VITALS ******************************************  Vital Signs Last 24 Hrs  T(C): 36.6 (27 Aug 2019 11:58), Max: 36.9 (26 Aug 2019 22:28)  T(F): 97.8 (27 Aug 2019 11:58), Max: 98.5 (26 Aug 2019 22:28)  HR: 73 (27 Aug 2019 11:58) (73 - 75)  BP: 112/58 (27 Aug 2019 11:58) (112/58 - 129/70)  BP(mean): --  RR: 18 (27 Aug 2019 11:58) (18 - 18)  SpO2: --    ******************************** PHYSICAL EXAM:**************************************************  	GENERAL: NAD, lying in bed comfortably  	HEAD:  Atraumatic, Normocephalic  	EYES: EOMI, PERRLA, conjunctiva and sclera clear  	ENT: Moist mucous membranes  	NECK: Supple, No JVD  	CHEST/LUNG: Clear to auscultation bilaterally; No rales, rhonchi, wheezing, or rubs. Unlabored respirations  	HEART: Regular rate and rhythm; No murmurs, rubs, or gallops  	ABDOMEN: Bowel sounds present; Soft, Nontender, Nondistended. No hepatomegally  	EXTREMITIES:  2+ Peripheral Pulses,  left leg swelling, redness, tenderness, venous insufficiency skin changes bilateral   	NERVOUS SYSTEM:  Alert & Oriented X3, speech clear. No deficits               MSK: FROM all 4 extremities, full and equal strength        LABS:                     14.4   8.62  )-----------( 265      ( 26 Aug 2019 04:30 )             43.7       08-26    138  |  98  |  17  ----------------------------<  169<H>  5.5<H>   |  26  |  1.1    Ca    9.6      26 Aug 2019 04:30    TPro  7.5  /  Alb  4.3  /  TBili  0.3  /  DBili  x   /  AST  25  /  ALT  37  /  AlkPhos  69  08-25      LIVER FUNCTIONS - ( 25 Aug 2019 07:22 )  Alb: 4.3 g/dL / Pro: 7.5 g/dL / ALK PHOS: 69 U/L / ALT: 37 U/L / AST: 25 U/L / GGT: x                 < from: VA Duplex Lower Ext Vein Scan, Left (08.27.19 @ 09:37) >  The left common femoral, great saphenous, femoral, popliteal and small   saphenous veins were visualized.    An acute left distal popliteal vein DVT is visualized.    The anterior tibial veins were  patent     The posterior tibial veins were  patent      The peroneal veins were patent.    Impression:    Acute left distal popliteal vein DVT.

## 2019-08-27 NOTE — PROGRESS NOTE ADULT - ASSESSMENT
53 year old male with history of HTN, DM on Glipizide presents to ED for worsening leg swelling and pain after being treated for cellulitis 1 week ago with Prabhu admitted for LLE cellulitis     Left leg cellulitis, failed outpatient treatment  - erythema worsening no improvement in pain (although the new unprovoked DVT found this am explains the source of worsening pain).    - no sepsis on admission, vitally stable currently   - currently on IV clindamycin 600 Q8, ID reccs appreciated   - Xray negative for any osseous abnormalities      Unprovoked Left Popliteal DVT  - + DVT on duplex 8/27  - Duplex on admission was negative for DVT  - started on Xarelto 15 mg BID for 21 days     DM on Glipizide at home   - FS AC/HS, start basal bolus if >180     HTN   - lisinopril held due to hyperkalemia on admission  - bp currently stable, will monitor bmp    # DVT PPX: lovonox  # full code  # DASH diet  # dispo: anticipated for home discharge tomorrow
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
## 2019-08-28 ENCOUNTER — TRANSCRIPTION ENCOUNTER (OUTPATIENT)
Age: 54
End: 2019-08-28

## 2019-08-28 VITALS
TEMPERATURE: 98 F | DIASTOLIC BLOOD PRESSURE: 61 MMHG | HEART RATE: 70 BPM | RESPIRATION RATE: 18 BRPM | SYSTOLIC BLOOD PRESSURE: 128 MMHG

## 2019-08-28 LAB
ANION GAP SERPL CALC-SCNC: 11 MMOL/L — SIGNIFICANT CHANGE UP (ref 7–14)
BASOPHILS # BLD AUTO: 0.05 K/UL — SIGNIFICANT CHANGE UP (ref 0–0.2)
BASOPHILS NFR BLD AUTO: 0.6 % — SIGNIFICANT CHANGE UP (ref 0–1)
BUN SERPL-MCNC: 18 MG/DL — SIGNIFICANT CHANGE UP (ref 10–20)
CALCIUM SERPL-MCNC: 9.4 MG/DL — SIGNIFICANT CHANGE UP (ref 8.5–10.1)
CHLORIDE SERPL-SCNC: 98 MMOL/L — SIGNIFICANT CHANGE UP (ref 98–110)
CO2 SERPL-SCNC: 27 MMOL/L — SIGNIFICANT CHANGE UP (ref 17–32)
CREAT SERPL-MCNC: 1 MG/DL — SIGNIFICANT CHANGE UP (ref 0.7–1.5)
EOSINOPHIL # BLD AUTO: 0.19 K/UL — SIGNIFICANT CHANGE UP (ref 0–0.7)
EOSINOPHIL NFR BLD AUTO: 2.2 % — SIGNIFICANT CHANGE UP (ref 0–8)
GLUCOSE BLDC GLUCOMTR-MCNC: 147 MG/DL — HIGH (ref 70–99)
GLUCOSE BLDC GLUCOMTR-MCNC: 150 MG/DL — HIGH (ref 70–99)
GLUCOSE SERPL-MCNC: 191 MG/DL — HIGH (ref 70–99)
HCT VFR BLD CALC: 41.3 % — LOW (ref 42–52)
HGB BLD-MCNC: 13.6 G/DL — LOW (ref 14–18)
IMM GRANULOCYTES NFR BLD AUTO: 0.3 % — SIGNIFICANT CHANGE UP (ref 0.1–0.3)
LYMPHOCYTES # BLD AUTO: 2.08 K/UL — SIGNIFICANT CHANGE UP (ref 1.2–3.4)
LYMPHOCYTES # BLD AUTO: 23.7 % — SIGNIFICANT CHANGE UP (ref 20.5–51.1)
MAGNESIUM SERPL-MCNC: 1.8 MG/DL — SIGNIFICANT CHANGE UP (ref 1.8–2.4)
MCHC RBC-ENTMCNC: 29.6 PG — SIGNIFICANT CHANGE UP (ref 27–31)
MCHC RBC-ENTMCNC: 32.9 G/DL — SIGNIFICANT CHANGE UP (ref 32–37)
MCV RBC AUTO: 90 FL — SIGNIFICANT CHANGE UP (ref 80–94)
MONOCYTES # BLD AUTO: 0.57 K/UL — SIGNIFICANT CHANGE UP (ref 0.1–0.6)
MONOCYTES NFR BLD AUTO: 6.5 % — SIGNIFICANT CHANGE UP (ref 1.7–9.3)
NEUTROPHILS # BLD AUTO: 5.84 K/UL — SIGNIFICANT CHANGE UP (ref 1.4–6.5)
NEUTROPHILS NFR BLD AUTO: 66.7 % — SIGNIFICANT CHANGE UP (ref 42.2–75.2)
NRBC # BLD: 0 /100 WBCS — SIGNIFICANT CHANGE UP (ref 0–0)
PLATELET # BLD AUTO: 264 K/UL — SIGNIFICANT CHANGE UP (ref 130–400)
POTASSIUM SERPL-MCNC: 4.7 MMOL/L — SIGNIFICANT CHANGE UP (ref 3.5–5)
POTASSIUM SERPL-SCNC: 4.7 MMOL/L — SIGNIFICANT CHANGE UP (ref 3.5–5)
RBC # BLD: 4.59 M/UL — LOW (ref 4.7–6.1)
RBC # FLD: 11.9 % — SIGNIFICANT CHANGE UP (ref 11.5–14.5)
SODIUM SERPL-SCNC: 136 MMOL/L — SIGNIFICANT CHANGE UP (ref 135–146)
WBC # BLD: 8.76 K/UL — SIGNIFICANT CHANGE UP (ref 4.8–10.8)
WBC # FLD AUTO: 8.76 K/UL — SIGNIFICANT CHANGE UP (ref 4.8–10.8)

## 2019-08-28 PROCEDURE — 99239 HOSP IP/OBS DSCHRG MGMT >30: CPT

## 2019-08-28 RX ADMIN — Medication 100 MILLIGRAM(S): at 05:15

## 2019-08-28 RX ADMIN — RIVAROXABAN 15 MILLIGRAM(S): KIT at 07:44

## 2019-08-28 RX ADMIN — Medication 100 MILLIGRAM(S): at 13:49

## 2019-08-28 NOTE — PROGRESS NOTE ADULT - SUBJECTIVE AND OBJECTIVE BOX
OCTAVIA GRAY  53y, Male      OVERNIGHT EVENTS:    no fevers, pain LLE better    VITALS:  T(F): 98, Max: 98.1 (08-27-19 @ 21:27)  HR: 70  BP: 128/61  RR: 18Vital Signs Last 24 Hrs  T(C): 36.7 (28 Aug 2019 12:55), Max: 36.7 (27 Aug 2019 21:27)  T(F): 98 (28 Aug 2019 12:55), Max: 98.1 (27 Aug 2019 21:27)  HR: 70 (28 Aug 2019 12:55) (65 - 78)  BP: 128/61 (28 Aug 2019 12:55) (127/62 - 131/57)  BP(mean): --  RR: 18 (28 Aug 2019 12:55) (18 - 18)  SpO2: --    TESTS & MEASUREMENTS:                        13.6   8.76  )-----------( 264      ( 28 Aug 2019 09:00 )             41.3     08-28    136  |  98  |  18  ----------------------------<  191<H>  4.7   |  27  |  1.0    Ca    9.4      28 Aug 2019 09:00  Mg     1.8     08-28          Culture - Blood (collected 08-25-19 @ 10:15)  Source: .Blood Blood-Peripheral  Preliminary Report (08-26-19 @ 17:01):    No growth to date.    Culture - Blood (collected 08-25-19 @ 10:15)  Source: .Blood Blood-Peripheral  Preliminary Report (08-26-19 @ 17:01):    No growth to date.            RADIOLOGY & ADDITIONAL TESTS:    ANTIBIOTICS:  ceFAZolin   IVPB 2000 milliGRAM(s) IV Intermittent every 8 hours
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
## 2019-08-28 NOTE — PROGRESS NOTE ADULT - SUBJECTIVE AND OBJECTIVE BOX
OCTAVIA GRAY  53y  [facility] [id number] [place] [address] A      Patient is a 53y old  Male who presents with a chief complaint of left leg cellulitis (28 Aug 2019 12:57)      INTERVAL HPI/OVERNIGHT EVENTS:    no acute events overnight     REVIEW OF SYSTEMS:  CONSTITUTIONAL: No fever, weight loss, or fatigue  EYES: No eye pain, visual disturbances, or discharge  ENMT:  No difficulty hearing, tinnitus, vertigo; No sinus or throat pain  NECK: No pain or stiffness  BREASTS: No pain, masses, or nipple discharge  RESPIRATORY: No cough, wheezing, chills or hemoptysis; No shortness of breath  CARDIOVASCULAR: No chest pain, palpitations, dizziness, or leg swelling  GASTROINTESTINAL: No abdominal or epigastric pain. No nausea, vomiting, or hematemesis; No diarrhea or constipation. No melena or hematochezia.  GENITOURINARY: No dysuria, frequency, hematuria, or incontinence  NEUROLOGICAL: No headaches, memory loss, loss of strength, numbness, or tremors  SKIN: No itching, burning, rashes, or lesions   LYMPH NODES: No enlarged glands  ENDOCRINE: No heat or cold intolerance; No hair loss  MUSCULOSKELETAL: left lower extremity edema improved   PSYCHIATRIC: No depression, anxiety, mood swings, or difficulty sleeping  HEME/LYMPH: No easy bruising, or bleeding gums  ALLERY AND IMMUNOLOGIC: No hives or eczema  FAMILY HISTORY:  Family history of diabetes mellitus    T(C): 36.7 (08-28-19 @ 12:55), Max: 36.7 (08-27-19 @ 21:27)  HR: 70 (08-28-19 @ 12:55) (65 - 78)  BP: 128/61 (08-28-19 @ 12:55) (127/62 - 131/57)  RR: 18 (08-28-19 @ 12:55) (18 - 18)  SpO2: --  Wt(kg): --Vital Signs Last 24 Hrs  T(C): 36.7 (28 Aug 2019 12:55), Max: 36.7 (27 Aug 2019 21:27)  T(F): 98 (28 Aug 2019 12:55), Max: 98.1 (27 Aug 2019 21:27)  HR: 70 (28 Aug 2019 12:55) (65 - 78)  BP: 128/61 (28 Aug 2019 12:55) (127/62 - 131/57)  BP(mean): --  RR: 18 (28 Aug 2019 12:55) (18 - 18)  SpO2: --    PHYSICAL EXAM:  GENERAL: NAD, well-groomed, well-developed  HEAD:  Atraumatic, Normocephalic  EYES: EOMI, PERRLA, conjunctiva and sclera clear  ENMT: No tonsillar erythema, exudates, or enlargement; Moist mucous membranes, Good dentition, No lesions  NECK: Supple, No JVD, Normal thyroid  NERVOUS SYSTEM:  Alert & Oriented X3, Good concentration; Motor Strength 5/5 B/L upper and lower extremities; DTRs 2+ intact and symmetric  PULM: Clear to auscultation bilaterally  CARDIAC: Regular rate and rhythm; No murmurs, rubs, or gallops  GI: Soft, Nontender, Nondistended; Bowel sounds present  EXTREMITIES:  bilateral lower extremity chronic intergumetary discoloration , with trace left lower extremity edema no rubor noted   LYMPH: No lymphadenopathy noted  SKIN: No rashes or lesions    Consultant(s) Notes Reviewed:  [x ] YES  [ ] NO  Care Discussed with Consultants/Other Providers [ x] YES  [ ] NO    LABS:                            13.6   8.76  )-----------( 264      ( 28 Aug 2019 09:00 )             41.3   08-28    136  |  98  |  18  ----------------------------<  191<H>  4.7   |  27  |  1.0    Ca    9.4      28 Aug 2019 09:00  Mg     1.8     08-28              ceFAZolin   IVPB 2000 milliGRAM(s) IV Intermittent every 8 hours  rivaroxaban 15 milliGRAM(s) Oral two times a day with meals      HEALTH ISSUES - PROBLEM Dx:          Case Discussed with House Staff   45 minutes spent on total encounter; more than 50% of the visit was spent counseling and/or coordinating care by the attending physician.   Spectra [phone]

## 2019-08-28 NOTE — PROGRESS NOTE ADULT - ASSESSMENT
#Left lower extremity cellulitis   sp ancef , dc home on doxyxyclin     #Acute left popliteal dvt  xarelto     #Obesity Morbid BMI 40 patient needs to see dieitian outpatient for further evaluation and recommend bariatric surgery evaluation outpatient     #DM   CAPILLARY BLOOD GLUCOSE      POCT Blood Glucose.: 147 mg/dL (28 Aug 2019 11:06)  POCT Blood Glucose.: 150 mg/dL (28 Aug 2019 07:15)  POCT Blood Glucose.: 135 mg/dL (27 Aug 2019 20:45)  POCT Blood Glucose.: 144 mg/dL (27 Aug 2019 16:11)  controlled    #CKD 2    Progress Note Handoff    Pending:  [facility]    Family discussion: patient is of sound mind    Disposition: Home___

## 2019-08-28 NOTE — DISCHARGE NOTE PROVIDER - HOSPITAL COURSE
53 year old male with history of DM, HTN on Glipizide presents to ED for worsening leg swelling and pain after being treated for cellulitis 1 week ago with Keflex.     patient was doing well until one week prior to presentation when he started to have pain, redness in the left leg associated with fever of 101 at home, was seen in ED on Tuesday and discharged on keflex,  pain has been worsening since the, denies any fever since been discharged, no history of trauma, no chills, or limited joint movement.    In ED: /60 , Heart Rate98 /min, RR 16 /min, Temp (F)98.1 Degrees F SpO2 (%)97 % on RA. Intact pulses. was admitted for treatment of cellulitis after failure of outpatient treatment. Patient was started on IV clindamycin in patient. Vitally stable since admission. DVT study done 8/27 was positive for left popliteal DVT, patient was started on xarelto 15mg BID. Patient's cellulitis has improved significantly since admission.

## 2019-08-28 NOTE — PROGRESS NOTE ADULT - REASON FOR ADMISSION
left leg cellulitis

## 2019-08-28 NOTE — DISCHARGE NOTE PROVIDER - NSDCCPCAREPLAN_GEN_ALL_CORE_FT
PRINCIPAL DISCHARGE DIAGNOSIS  Diagnosis: Cellulitis  Assessment and Plan of Treatment: Your cellulitis symptomps were likely not improving due to the clot that had formed in your leg. Please continue to take xarelto 2x day for 21 days and follow up with your PMD in two weeks. Please take your antibiotics for 5 more days.

## 2019-08-28 NOTE — DISCHARGE NOTE NURSING/CASE MANAGEMENT/SOCIAL WORK - PATIENT PORTAL LINK FT
You can access the FollowMyHealth Patient Portal offered by [facility] by registering at the following website: [email or web address]. By joining [employer]’s FollowMyHealth portal, you will also be able to view your health information using other applications (apps) compatible with our system.
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
## 2019-08-30 DIAGNOSIS — I82.432 ACUTE EMBOLISM AND THROMBOSIS OF LEFT POPLITEAL VEIN: ICD-10-CM

## 2019-08-30 DIAGNOSIS — S14.129S: ICD-10-CM

## 2019-08-30 DIAGNOSIS — I12.9 HYPERTENSIVE CHRONIC KIDNEY DISEASE WITH STAGE 1 THROUGH STAGE 4 CHRONIC KIDNEY DISEASE, OR UNSPECIFIED CHRONIC KIDNEY DISEASE: ICD-10-CM

## 2019-08-30 DIAGNOSIS — L03.116 CELLULITIS OF LEFT LOWER LIMB: ICD-10-CM

## 2019-08-30 DIAGNOSIS — E11.22 TYPE 2 DIABETES MELLITUS WITH DIABETIC CHRONIC KIDNEY DISEASE: ICD-10-CM

## 2019-08-30 DIAGNOSIS — N18.2 CHRONIC KIDNEY DISEASE, STAGE 2 (MILD): ICD-10-CM

## 2019-08-30 DIAGNOSIS — E66.01 MORBID (SEVERE) OBESITY DUE TO EXCESS CALORIES: ICD-10-CM

## 2019-08-30 LAB
CULTURE RESULTS: SIGNIFICANT CHANGE UP
CULTURE RESULTS: SIGNIFICANT CHANGE UP
SPECIMEN SOURCE: SIGNIFICANT CHANGE UP
SPECIMEN SOURCE: SIGNIFICANT CHANGE UP

## 2020-01-14 ENCOUNTER — INPATIENT (INPATIENT)
Facility: HOSPITAL | Age: 55
LOS: 1 days | Discharge: HOME | End: 2020-01-16
Attending: HOSPITALIST | Admitting: HOSPITALIST
Payer: COMMERCIAL

## 2020-01-14 VITALS
HEART RATE: 90 BPM | TEMPERATURE: 98 F | DIASTOLIC BLOOD PRESSURE: 73 MMHG | OXYGEN SATURATION: 96 % | SYSTOLIC BLOOD PRESSURE: 143 MMHG | RESPIRATION RATE: 18 BRPM

## 2020-01-14 LAB
ALBUMIN SERPL ELPH-MCNC: 4.3 G/DL — SIGNIFICANT CHANGE UP (ref 3.5–5.2)
ALP SERPL-CCNC: 56 U/L — SIGNIFICANT CHANGE UP (ref 30–115)
ALT FLD-CCNC: 19 U/L — SIGNIFICANT CHANGE UP (ref 0–41)
ANION GAP SERPL CALC-SCNC: 13 MMOL/L — SIGNIFICANT CHANGE UP (ref 7–14)
APPEARANCE UR: CLEAR — SIGNIFICANT CHANGE UP
APTT BLD: 30.2 SEC — SIGNIFICANT CHANGE UP (ref 27–39.2)
AST SERPL-CCNC: 14 U/L — SIGNIFICANT CHANGE UP (ref 0–41)
BASOPHILS # BLD AUTO: 0.04 K/UL — SIGNIFICANT CHANGE UP (ref 0–0.2)
BASOPHILS NFR BLD AUTO: 0.3 % — SIGNIFICANT CHANGE UP (ref 0–1)
BILIRUB SERPL-MCNC: 0.7 MG/DL — SIGNIFICANT CHANGE UP (ref 0.2–1.2)
BILIRUB UR-MCNC: NEGATIVE — SIGNIFICANT CHANGE UP
BUN SERPL-MCNC: 19 MG/DL — SIGNIFICANT CHANGE UP (ref 10–20)
CALCIUM SERPL-MCNC: 9.2 MG/DL — SIGNIFICANT CHANGE UP (ref 8.5–10.1)
CHLORIDE SERPL-SCNC: 100 MMOL/L — SIGNIFICANT CHANGE UP (ref 98–110)
CO2 SERPL-SCNC: 27 MMOL/L — SIGNIFICANT CHANGE UP (ref 17–32)
COLOR SPEC: YELLOW — SIGNIFICANT CHANGE UP
CREAT SERPL-MCNC: 0.9 MG/DL — SIGNIFICANT CHANGE UP (ref 0.7–1.5)
DIFF PNL FLD: NEGATIVE — SIGNIFICANT CHANGE UP
EOSINOPHIL # BLD AUTO: 0.1 K/UL — SIGNIFICANT CHANGE UP (ref 0–0.7)
EOSINOPHIL NFR BLD AUTO: 0.8 % — SIGNIFICANT CHANGE UP (ref 0–8)
GLUCOSE SERPL-MCNC: 110 MG/DL — HIGH (ref 70–99)
GLUCOSE UR QL: NEGATIVE — SIGNIFICANT CHANGE UP
HCT VFR BLD CALC: 44.1 % — SIGNIFICANT CHANGE UP (ref 42–52)
HGB BLD-MCNC: 14.6 G/DL — SIGNIFICANT CHANGE UP (ref 14–18)
IMM GRANULOCYTES NFR BLD AUTO: 0.3 % — SIGNIFICANT CHANGE UP (ref 0.1–0.3)
INR BLD: 1.5 RATIO — HIGH (ref 0.65–1.3)
KETONES UR-MCNC: ABNORMAL
LACTATE SERPL-SCNC: 0.9 MMOL/L — SIGNIFICANT CHANGE UP (ref 0.7–2)
LEUKOCYTE ESTERASE UR-ACNC: NEGATIVE — SIGNIFICANT CHANGE UP
LYMPHOCYTES # BLD AUTO: 16.9 % — LOW (ref 20.5–51.1)
LYMPHOCYTES # BLD AUTO: 2.01 K/UL — SIGNIFICANT CHANGE UP (ref 1.2–3.4)
MCHC RBC-ENTMCNC: 31.2 PG — HIGH (ref 27–31)
MCHC RBC-ENTMCNC: 33.1 G/DL — SIGNIFICANT CHANGE UP (ref 32–37)
MCV RBC AUTO: 94.2 FL — HIGH (ref 80–94)
MONOCYTES # BLD AUTO: 1.21 K/UL — HIGH (ref 0.1–0.6)
MONOCYTES NFR BLD AUTO: 10.2 % — HIGH (ref 1.7–9.3)
NEUTROPHILS # BLD AUTO: 8.5 K/UL — HIGH (ref 1.4–6.5)
NEUTROPHILS NFR BLD AUTO: 71.5 % — SIGNIFICANT CHANGE UP (ref 42.2–75.2)
NITRITE UR-MCNC: NEGATIVE — SIGNIFICANT CHANGE UP
NRBC # BLD: 0 /100 WBCS — SIGNIFICANT CHANGE UP (ref 0–0)
PH UR: 6.5 — SIGNIFICANT CHANGE UP (ref 5–8)
PLATELET # BLD AUTO: 178 K/UL — SIGNIFICANT CHANGE UP (ref 130–400)
POTASSIUM SERPL-MCNC: 4 MMOL/L — SIGNIFICANT CHANGE UP (ref 3.5–5)
POTASSIUM SERPL-SCNC: 4 MMOL/L — SIGNIFICANT CHANGE UP (ref 3.5–5)
PROT SERPL-MCNC: 7.3 G/DL — SIGNIFICANT CHANGE UP (ref 6–8)
PROT UR-MCNC: SIGNIFICANT CHANGE UP
PROTHROM AB SERPL-ACNC: 17.2 SEC — HIGH (ref 9.95–12.87)
RBC # BLD: 4.68 M/UL — LOW (ref 4.7–6.1)
RBC # FLD: 13 % — SIGNIFICANT CHANGE UP (ref 11.5–14.5)
SODIUM SERPL-SCNC: 140 MMOL/L — SIGNIFICANT CHANGE UP (ref 135–146)
SP GR SPEC: 1.02 — SIGNIFICANT CHANGE UP (ref 1.01–1.02)
UROBILINOGEN FLD QL: ABNORMAL
WBC # BLD: 11.9 K/UL — HIGH (ref 4.8–10.8)
WBC # FLD AUTO: 11.9 K/UL — HIGH (ref 4.8–10.8)

## 2020-01-14 PROCEDURE — 93010 ELECTROCARDIOGRAM REPORT: CPT

## 2020-01-14 PROCEDURE — 73590 X-RAY EXAM OF LOWER LEG: CPT | Mod: 26,LT

## 2020-01-14 PROCEDURE — 71046 X-RAY EXAM CHEST 2 VIEWS: CPT | Mod: 26

## 2020-01-14 PROCEDURE — 99285 EMERGENCY DEPT VISIT HI MDM: CPT

## 2020-01-14 RX ORDER — METRONIDAZOLE 500 MG
500 TABLET ORAL ONCE
Refills: 0 | Status: COMPLETED | OUTPATIENT
Start: 2020-01-14 | End: 2020-01-14

## 2020-01-14 RX ORDER — SODIUM CHLORIDE 9 MG/ML
1000 INJECTION, SOLUTION INTRAVENOUS ONCE
Refills: 0 | Status: COMPLETED | OUTPATIENT
Start: 2020-01-14 | End: 2020-01-14

## 2020-01-14 RX ORDER — CEFTRIAXONE 500 MG/1
2000 INJECTION, POWDER, FOR SOLUTION INTRAMUSCULAR; INTRAVENOUS ONCE
Refills: 0 | Status: COMPLETED | OUTPATIENT
Start: 2020-01-14 | End: 2020-01-14

## 2020-01-14 RX ORDER — ACETAMINOPHEN 500 MG
975 TABLET ORAL ONCE
Refills: 0 | Status: COMPLETED | OUTPATIENT
Start: 2020-01-14 | End: 2020-01-14

## 2020-01-14 RX ADMIN — Medication 100 MILLIGRAM(S): at 21:30

## 2020-01-14 RX ADMIN — SODIUM CHLORIDE 1000 MILLILITER(S): 9 INJECTION, SOLUTION INTRAVENOUS at 21:31

## 2020-01-14 RX ADMIN — Medication 975 MILLIGRAM(S): at 21:31

## 2020-01-14 RX ADMIN — CEFTRIAXONE 100 MILLIGRAM(S): 500 INJECTION, POWDER, FOR SOLUTION INTRAMUSCULAR; INTRAVENOUS at 21:31

## 2020-01-14 NOTE — ED PROVIDER NOTE - NS ED ROS FT
Review of Systems         Constitutional: (+) fever (-) chills (-) weakness       EENT: (-) visual changes (-) sore throat (-) congestion       Cardiovascular: (-) chest pain (-) syncope       Respiratory: (-) cough, (-) shortness of breath       Gastrointestinal: (-) abdominal pain (-) vomiting (-) diarrhea (-) nausea (-) constipation       Genitourinary: (-) dysuria (-) frequency (-) hematuria       Musculoskeletal: (-) neck pain (-) back pain (+) joint pain       Integumentary: (+) rash       Neurological: (-) headache (-) altered mental status (-) dizziness (-) paresthesias       Psych: (-) psych history

## 2020-01-14 NOTE — ED ADULT TRIAGE NOTE - CHIEF COMPLAINT QUOTE
Pt sent in by MD from office; pt has chronic DVT in left leg & cellulitis to left leg, c/o of left calf pain, sent in for IV abx.

## 2020-01-14 NOTE — ED PROVIDER NOTE - ATTENDING CONTRIBUTION TO CARE
53 y/o male with h/o DM, HTN, CKD, DVT on xarelto, in ER with c/o LLE redness, swelling for the past 3 days.  Denies any trauma to area.  no break in skin or fluid drainage.  no numbness.  + low grade temp to 100 at home.  No cp/sob.  no abd pain.  no n/v/d.  no ha/dizziness/loc.  Pt states has been compliant with xarelto.  Pt went had LE duplex today for re-eval of his L DVT, was read as chronic L popliteal DVT, no acute DVT (pt has copy of results with him in ER, to be scanned into chart).   PE - nad, nc/at, eomi, perrl, op - clear, cta b/l, no w/r/r, rrr, abd - soft, nt/nd, nabs, from x 4, LLE - lower leg with swelling, warmth, erythema, 2+ DP pulse, A&O x 3, cn 2-12 intact, no motor/sensory deficits.  -check labs, culture, iv abx.

## 2020-01-14 NOTE — ED PROVIDER NOTE - PHYSICAL EXAMINATION
Physical Exam    Vital Signs: I have reviewed the initial vital signs  Constitutional: well-nourished, appears stated age, no acute distress  EENT: Conjunctiva pink, Sclera clear, PERRLA, EOMI. Mucous membranes moist, no exudates or lesions noted, uvula midline.  Cardiovascular: S1 and S2 present, regular rate, regular rhythm. Well perfused extremities, 2+ pitting edema in LLE. DP 2 + b/l  Respiratory: unlabored respiratory effort, clear to auscultation bilaterally no wheezing, rales or rhonchi  Gastrointestinal: soft, non-tender abdomen. No guarding or rebound tenderness  Musculoskeletal: supple nontender neck, no midline tenderness. Left LE: chronic stasis cranages on anterior shin with surrounding cellulitis and warmth to touch. TTP over rash which spares foot  Integumentary: warm, dry, no rash  Psychiatric: appropriate mood, appropriate affect

## 2020-01-14 NOTE — ED PROVIDER NOTE - CLINICAL SUMMARY MEDICAL DECISION MAKING FREE TEXT BOX
pt with h/o dvt compliant with xarelto, dm, htn, in ER with LLE redness/swelling, + low grade temp.  had LE duplex earlier today which showed chronic L popliteal dvt (had L popliteal DVT on prior sono 8/2019).  will admit for iv abx.

## 2020-01-15 DIAGNOSIS — Z98.890 OTHER SPECIFIED POSTPROCEDURAL STATES: Chronic | ICD-10-CM

## 2020-01-15 DIAGNOSIS — Z90.49 ACQUIRED ABSENCE OF OTHER SPECIFIED PARTS OF DIGESTIVE TRACT: Chronic | ICD-10-CM

## 2020-01-15 DIAGNOSIS — I80.229 PHLEBITIS AND THROMBOPHLEBITIS OF UNSPECIFIED POPLITEAL VEIN: ICD-10-CM

## 2020-01-15 PROBLEM — E11.9 TYPE 2 DIABETES MELLITUS WITHOUT COMPLICATIONS: Chronic | Status: ACTIVE | Noted: 2019-08-25

## 2020-01-15 PROBLEM — S14.129A: Chronic | Status: ACTIVE | Noted: 2019-08-25

## 2020-01-15 LAB
ANION GAP SERPL CALC-SCNC: 16 MMOL/L — HIGH (ref 7–14)
BASOPHILS # BLD AUTO: 0.03 K/UL — SIGNIFICANT CHANGE UP (ref 0–0.2)
BASOPHILS NFR BLD AUTO: 0.3 % — SIGNIFICANT CHANGE UP (ref 0–1)
BUN SERPL-MCNC: 16 MG/DL — SIGNIFICANT CHANGE UP (ref 10–20)
CALCIUM SERPL-MCNC: 8.8 MG/DL — SIGNIFICANT CHANGE UP (ref 8.5–10.1)
CHLORIDE SERPL-SCNC: 100 MMOL/L — SIGNIFICANT CHANGE UP (ref 98–110)
CO2 SERPL-SCNC: 22 MMOL/L — SIGNIFICANT CHANGE UP (ref 17–32)
CREAT SERPL-MCNC: 0.8 MG/DL — SIGNIFICANT CHANGE UP (ref 0.7–1.5)
EOSINOPHIL # BLD AUTO: 0.09 K/UL — SIGNIFICANT CHANGE UP (ref 0–0.7)
EOSINOPHIL NFR BLD AUTO: 0.8 % — SIGNIFICANT CHANGE UP (ref 0–8)
ESTIMATED AVERAGE GLUCOSE: 131 MG/DL — HIGH (ref 68–114)
GLUCOSE BLDC GLUCOMTR-MCNC: 106 MG/DL — HIGH (ref 70–99)
GLUCOSE BLDC GLUCOMTR-MCNC: 134 MG/DL — HIGH (ref 70–99)
GLUCOSE BLDC GLUCOMTR-MCNC: 135 MG/DL — HIGH (ref 70–99)
GLUCOSE BLDC GLUCOMTR-MCNC: 179 MG/DL — HIGH (ref 70–99)
GLUCOSE SERPL-MCNC: 128 MG/DL — HIGH (ref 70–99)
HBA1C BLD-MCNC: 6.2 % — HIGH (ref 4–5.6)
HCT VFR BLD CALC: 40.7 % — LOW (ref 42–52)
HGB BLD-MCNC: 13.7 G/DL — LOW (ref 14–18)
IMM GRANULOCYTES NFR BLD AUTO: 0.4 % — HIGH (ref 0.1–0.3)
LYMPHOCYTES # BLD AUTO: 1.67 K/UL — SIGNIFICANT CHANGE UP (ref 1.2–3.4)
LYMPHOCYTES # BLD AUTO: 14.7 % — LOW (ref 20.5–51.1)
MCHC RBC-ENTMCNC: 30.3 PG — SIGNIFICANT CHANGE UP (ref 27–31)
MCHC RBC-ENTMCNC: 33.7 G/DL — SIGNIFICANT CHANGE UP (ref 32–37)
MCV RBC AUTO: 90 FL — SIGNIFICANT CHANGE UP (ref 80–94)
MONOCYTES # BLD AUTO: 0.91 K/UL — HIGH (ref 0.1–0.6)
MONOCYTES NFR BLD AUTO: 8 % — SIGNIFICANT CHANGE UP (ref 1.7–9.3)
NEUTROPHILS # BLD AUTO: 8.62 K/UL — HIGH (ref 1.4–6.5)
NEUTROPHILS NFR BLD AUTO: 75.8 % — HIGH (ref 42.2–75.2)
NRBC # BLD: 0 /100 WBCS — SIGNIFICANT CHANGE UP (ref 0–0)
PLATELET # BLD AUTO: 158 K/UL — SIGNIFICANT CHANGE UP (ref 130–400)
POTASSIUM SERPL-MCNC: 4.1 MMOL/L — SIGNIFICANT CHANGE UP (ref 3.5–5)
POTASSIUM SERPL-SCNC: 4.1 MMOL/L — SIGNIFICANT CHANGE UP (ref 3.5–5)
RBC # BLD: 4.52 M/UL — LOW (ref 4.7–6.1)
RBC # FLD: 12.9 % — SIGNIFICANT CHANGE UP (ref 11.5–14.5)
SODIUM SERPL-SCNC: 138 MMOL/L — SIGNIFICANT CHANGE UP (ref 135–146)
WBC # BLD: 11.36 K/UL — HIGH (ref 4.8–10.8)
WBC # FLD AUTO: 11.36 K/UL — HIGH (ref 4.8–10.8)

## 2020-01-15 PROCEDURE — 99222 1ST HOSP IP/OBS MODERATE 55: CPT | Mod: AI

## 2020-01-15 RX ORDER — DEXTROSE 50 % IN WATER 50 %
15 SYRINGE (ML) INTRAVENOUS ONCE
Refills: 0 | Status: DISCONTINUED | OUTPATIENT
Start: 2020-01-15 | End: 2020-01-16

## 2020-01-15 RX ORDER — DEXTROSE 50 % IN WATER 50 %
25 SYRINGE (ML) INTRAVENOUS ONCE
Refills: 0 | Status: DISCONTINUED | OUTPATIENT
Start: 2020-01-15 | End: 2020-01-16

## 2020-01-15 RX ORDER — CEFAZOLIN SODIUM 1 G
2000 VIAL (EA) INJECTION ONCE
Refills: 0 | Status: COMPLETED | OUTPATIENT
Start: 2020-01-15 | End: 2020-01-15

## 2020-01-15 RX ORDER — ACETAMINOPHEN 500 MG
650 TABLET ORAL EVERY 6 HOURS
Refills: 0 | Status: DISCONTINUED | OUTPATIENT
Start: 2020-01-15 | End: 2020-01-16

## 2020-01-15 RX ORDER — CEFAZOLIN SODIUM 1 G
VIAL (EA) INJECTION
Refills: 0 | Status: DISCONTINUED | OUTPATIENT
Start: 2020-01-15 | End: 2020-01-16

## 2020-01-15 RX ORDER — IBUPROFEN 200 MG
400 TABLET ORAL EVERY 8 HOURS
Refills: 0 | Status: DISCONTINUED | OUTPATIENT
Start: 2020-01-15 | End: 2020-01-16

## 2020-01-15 RX ORDER — INSULIN LISPRO 100/ML
VIAL (ML) SUBCUTANEOUS
Refills: 0 | Status: DISCONTINUED | OUTPATIENT
Start: 2020-01-15 | End: 2020-01-16

## 2020-01-15 RX ORDER — RIVAROXABAN 15 MG-20MG
15 KIT ORAL
Refills: 0 | Status: DISCONTINUED | OUTPATIENT
Start: 2020-01-15 | End: 2020-01-16

## 2020-01-15 RX ORDER — GLUCAGON INJECTION, SOLUTION 0.5 MG/.1ML
1 INJECTION, SOLUTION SUBCUTANEOUS ONCE
Refills: 0 | Status: DISCONTINUED | OUTPATIENT
Start: 2020-01-15 | End: 2020-01-16

## 2020-01-15 RX ORDER — METFORMIN HYDROCHLORIDE 850 MG/1
1 TABLET ORAL
Qty: 0 | Refills: 0 | DISCHARGE

## 2020-01-15 RX ORDER — DEXTROSE 50 % IN WATER 50 %
12.5 SYRINGE (ML) INTRAVENOUS ONCE
Refills: 0 | Status: DISCONTINUED | OUTPATIENT
Start: 2020-01-15 | End: 2020-01-16

## 2020-01-15 RX ORDER — PANTOPRAZOLE SODIUM 20 MG/1
40 TABLET, DELAYED RELEASE ORAL
Refills: 0 | Status: DISCONTINUED | OUTPATIENT
Start: 2020-01-15 | End: 2020-01-16

## 2020-01-15 RX ORDER — CEFAZOLIN SODIUM 1 G
2000 VIAL (EA) INJECTION EVERY 8 HOURS
Refills: 0 | Status: DISCONTINUED | OUTPATIENT
Start: 2020-01-15 | End: 2020-01-16

## 2020-01-15 RX ORDER — SODIUM CHLORIDE 9 MG/ML
1000 INJECTION, SOLUTION INTRAVENOUS
Refills: 0 | Status: DISCONTINUED | OUTPATIENT
Start: 2020-01-15 | End: 2020-01-16

## 2020-01-15 RX ADMIN — SODIUM CHLORIDE 1000 MILLILITER(S): 9 INJECTION, SOLUTION INTRAVENOUS at 00:13

## 2020-01-15 RX ADMIN — RIVAROXABAN 15 MILLIGRAM(S): KIT at 17:22

## 2020-01-15 RX ADMIN — CEFTRIAXONE 2000 MILLIGRAM(S): 500 INJECTION, POWDER, FOR SOLUTION INTRAMUSCULAR; INTRAVENOUS at 00:13

## 2020-01-15 RX ADMIN — PANTOPRAZOLE SODIUM 40 MILLIGRAM(S): 20 TABLET, DELAYED RELEASE ORAL at 08:25

## 2020-01-15 RX ADMIN — Medication 100 MILLIGRAM(S): at 21:02

## 2020-01-15 RX ADMIN — RIVAROXABAN 15 MILLIGRAM(S): KIT at 08:26

## 2020-01-15 RX ADMIN — Medication 100 MILLIGRAM(S): at 16:00

## 2020-01-15 RX ADMIN — Medication 100 MILLIGRAM(S): at 03:23

## 2020-01-15 RX ADMIN — Medication 500 MILLIGRAM(S): at 00:13

## 2020-01-15 RX ADMIN — Medication 975 MILLIGRAM(S): at 00:13

## 2020-01-15 NOTE — ED ADULT NURSE NOTE - PMH
Pt called in stating he woke up 3 days ago with an extreme urge to urinate it was uncontrollable . When he did go it burned really bad. He did say this is an issue dr bhat is aware about that have been going back and fourth . Now he also has a high fever of 101 . He lives in [facility] and was wondering if some medication could be called in for him. Also a nurse triage call if more description is needed he noted    Appendicitis    Central cord syndrome    Diabetes    Hypertension    Kidney disease

## 2020-01-15 NOTE — ED ADULT NURSE NOTE - OBJECTIVE STATEMENT
Pt came to ED c/o lower L leg cellulitis. Redness, swelling noticed to L leg. As per pt he has Hx DVT and has clot in L leg, is on blood thinners but clot didn't go away. Pt was sent to ED by primary care doctor. A,Ox4, no SOB or distress noted.

## 2020-01-15 NOTE — ED ADULT NURSE NOTE - NSIMPLEMENTINTERV_GEN_ALL_ED
Implemented All Universal Safety Interventions:  [place] to call system. Call bell, personal items and telephone within reach. Instruct patient to call for assistance. Room bathroom lighting operational. Non-slip footwear when patient is off stretcher. Physically safe environment: no spills, clutter or unnecessary equipment. Stretcher in lowest position, wheels locked, appropriate side rails in place.
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
## 2020-01-15 NOTE — H&P ADULT - NSHPLABSRESULTS_GEN_ALL_CORE
14.6   11.90 )-----------( 178      ( 14 Jan 2020 20:15 )             44.1     01-14    140  |  100  |  19  ----------------------------<  110<H>  4.0   |  27  |  0.9    Ca    9.2      14 Jan 2020 20:15    TPro  7.3  /  Alb  4.3  /  TBili  0.7  /  DBili  x   /  AST  14  /  ALT  19  /  AlkPhos  56  01-14    < from: 12 Lead ECG (01.14.20 @ 20:33) >      Diagnosis Line Normal sinus rhythm  Normal ECG    < end of copied text >

## 2020-01-15 NOTE — H&P ADULT - ATTENDING COMMENTS
Patient with DMII and morbid obesity presented with nonpurulent cellulitis after failing outpatient treatment. He is responding to the current regimen. Will likely discharge in AM. Patient with DMII and morbid obesity presented with nonpurulent cellulitis. He is responding to the current regimen. Will likely discharge in AM.
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
## 2020-01-16 ENCOUNTER — TRANSCRIPTION ENCOUNTER (OUTPATIENT)
Age: 55
End: 2020-01-16

## 2020-01-16 VITALS
RESPIRATION RATE: 18 BRPM | HEART RATE: 69 BPM | DIASTOLIC BLOOD PRESSURE: 67 MMHG | TEMPERATURE: 97 F | SYSTOLIC BLOOD PRESSURE: 147 MMHG

## 2020-01-16 LAB
CULTURE RESULTS: SIGNIFICANT CHANGE UP
GLUCOSE BLDC GLUCOMTR-MCNC: 148 MG/DL — HIGH (ref 70–99)
GLUCOSE BLDC GLUCOMTR-MCNC: 150 MG/DL — HIGH (ref 70–99)
SPECIMEN SOURCE: SIGNIFICANT CHANGE UP

## 2020-01-16 PROCEDURE — 99238 HOSP IP/OBS DSCHRG MGMT 30/<: CPT

## 2020-01-16 RX ORDER — CEPHALEXIN 500 MG
1 CAPSULE ORAL
Qty: 28 | Refills: 0
Start: 2020-01-16 | End: 2020-01-22

## 2020-01-16 RX ADMIN — Medication 100 MILLIGRAM(S): at 13:07

## 2020-01-16 RX ADMIN — Medication 100 MILLIGRAM(S): at 06:12

## 2020-01-16 RX ADMIN — PANTOPRAZOLE SODIUM 40 MILLIGRAM(S): 20 TABLET, DELAYED RELEASE ORAL at 08:28

## 2020-01-16 RX ADMIN — RIVAROXABAN 15 MILLIGRAM(S): KIT at 08:28

## 2020-01-16 NOTE — DISCHARGE NOTE PROVIDER - NSDCMRMEDTOKEN_GEN_ALL_CORE_FT
doxycycline hyclate 100 mg oral capsule: 1 cap(s) orally 2 times a day   glipiZIDE 10 mg oral tablet: 1 tab(s) orally once a day  metFORMIN 500 mg oral tablet: 1 tab(s) orally once a day  rivaroxaban 15 mg oral tablet: 1 tab(s) orally 2 times a day cephalexin 500 mg oral tablet: 1 tab(s) orally every 6 hours   glipiZIDE 10 mg oral tablet: 1 tab(s) orally once a day  metFORMIN 500 mg oral tablet: 1 tab(s) orally once a day  rivaroxaban 15 mg oral tablet: 1 tab(s) orally 2 times a day

## 2020-01-16 NOTE — DISCHARGE NOTE PROVIDER - NSDCCPCAREPLAN_GEN_ALL_CORE_FT
PRINCIPAL DISCHARGE DIAGNOSIS  Diagnosis: Cellulitis  Assessment and Plan of Treatment: You were diagnosed with nonpurulent cellulitis. You were placed on IV antibiotics with improvement of your symptoms. Continue to take oral antibiotics for the next 7 days as outpatient on discharge. Follow up with your primary care physician. You may also benefit from seeing a vascular surgeon outside for evaluation of your lower extremity swelling.

## 2020-01-16 NOTE — DISCHARGE NOTE NURSING/CASE MANAGEMENT/SOCIAL WORK - PATIENT PORTAL LINK FT
You can access the FollowMyHealth Patient Portal offered by [facility] by registering at the following website: [email or web address]. By joining [employer]’s FollowMyHealth portal, you will also be able to view your health information using other applications (apps) compatible with our system.

## 2020-01-16 NOTE — DISCHARGE NOTE PROVIDER - HOSPITAL COURSE
HPI:    53 yo male patient with PMH of DMII, central cord syndrome presented for left lower extremity pain and swelling. Patient started experiencing pain and swelling of his left leg 3 days ago with erythema and low grade fever, maximum temperature at home is 100. He also mentioned episodes of vomiting on Sunday and Monday that resolved by itself. He denied any trauma to the leg, tick or mosquito bites, discharge, chills or other symptoms.    To note that the patient presented to the ED in august for the same complaint, was diagnosed with cellulitis and discharged on Keflex. He was admitted a week later for worsening of his symptoms. VA duplex showed left distal popliteal vein DVT. He was started on anticoagulation and IV clindamycin with improvement and was discharged on xarelto.    Patient had to follow up for duplex repeat for DVT. He was seen at [facility] today and repeat duplex was negative for new DVT, It showed chronic thrombus unchanged from previous scan.        In the ED /60 HR 75 T 98.7 Spo2 96 on RA.    He received 1 dose of ceftriaxone 2 g and flagyl 500 mg. (15 Jack 2020 01:02)            Patient was started on cefazolin 2g q8h for nonpurulent cellulitis.     Patient was not septic on admission. HPI:    53 yo male patient with PMH of DMII, central cord syndrome presented for left lower extremity pain and swelling. Patient started experiencing pain and swelling of his left leg 3 days ago with erythema and low grade fever, maximum temperature at home is 100. He also mentioned episodes of vomiting on Sunday and Monday that resolved by itself. He denied any trauma to the leg, tick or mosquito bites, discharge, chills or other symptoms.    To note that the patient presented to the ED in august for the same complaint, was diagnosed with cellulitis and discharged on Keflex. He was admitted a week later for worsening of his symptoms. VA duplex showed left distal popliteal vein DVT. He was started on anticoagulation and IV clindamycin with improvement and was discharged on xarelto.    Patient had to follow up for duplex repeat for DVT. He was seen at [facility] today and repeat duplex was negative for new DVT, It showed chronic thrombus unchanged from previous scan.        In the ED /60 HR 75 T 98.7 Spo2 96 on RA.    He received 1 dose of ceftriaxone 2 g and flagyl 500 mg. (15 Jack 2020 01:02)            Patient was started on cefazolin 2g q8h for nonpurulent cellulitis.     Patient was not septic on admission.     Patient noted symptomatic improvement within 24h.    Stated that the swelling of his left leg has decreased significantly.    Patient feels well otherwise.    Patient is stable for discharge with 7 more days of cephalexin 500mg 4 times/d. HPI:    53 yo male patient with PMH of DMII, central cord syndrome presented for left lower extremity pain and swelling. Patient started experiencing pain and swelling of his left leg 3 days ago with erythema and low grade fever, maximum temperature at home is 100. He also mentioned episodes of vomiting on Sunday and Monday that resolved by itself. He denied any trauma to the leg, tick or mosquito bites, discharge, chills or other symptoms.    To note that the patient presented to the ED in august for the same complaint, was diagnosed with cellulitis and discharged on Keflex. He was admitted a week later for worsening of his symptoms. VA duplex showed left distal popliteal vein DVT. He was started on anticoagulation and IV clindamycin with improvement and was discharged on xarelto.    Patient had to follow up for duplex repeat for DVT. He was seen at [facility] today and repeat duplex was negative for new DVT, It showed chronic thrombus unchanged from previous scan.        In the ED /60 HR 75 T 98.7 Spo2 96 on RA.    He received 1 dose of ceftriaxone 2 g and flagyl 500 mg. (15 Jack 2020 01:02)            Patient was started on cefazolin 2g q8h for nonpurulent cellulitis.     Sepsis ruled out on admission.     Patient noted symptomatic improvement within 24h.    Stated that the swelling of his left leg has decreased significantly.    Patient feels well otherwise.    Patient is stable for discharge with 7 more days of cephalexin 500mg 4 times/d.

## 2020-01-20 LAB
CULTURE RESULTS: SIGNIFICANT CHANGE UP
SPECIMEN SOURCE: SIGNIFICANT CHANGE UP

## 2020-01-21 DIAGNOSIS — E11.9 TYPE 2 DIABETES MELLITUS WITHOUT COMPLICATIONS: ICD-10-CM

## 2020-01-21 DIAGNOSIS — L03.116 CELLULITIS OF LEFT LOWER LIMB: ICD-10-CM

## 2020-01-21 DIAGNOSIS — Z79.01 LONG TERM (CURRENT) USE OF ANTICOAGULANTS: ICD-10-CM

## 2020-01-21 DIAGNOSIS — G83.89 OTHER SPECIFIED PARALYTIC SYNDROMES: ICD-10-CM

## 2020-01-21 DIAGNOSIS — I82.532 CHRONIC EMBOLISM AND THROMBOSIS OF LEFT POPLITEAL VEIN: ICD-10-CM

## 2020-01-21 DIAGNOSIS — E66.01 MORBID (SEVERE) OBESITY DUE TO EXCESS CALORIES: ICD-10-CM

## 2020-01-21 DIAGNOSIS — I87.8 OTHER SPECIFIED DISORDERS OF VEINS: ICD-10-CM

## 2020-01-23 PROBLEM — Z00.00 ENCOUNTER FOR PREVENTIVE HEALTH EXAMINATION: Status: ACTIVE | Noted: 2020-01-23

## 2020-01-30 ENCOUNTER — APPOINTMENT (OUTPATIENT)
Dept: VASCULAR SURGERY | Facility: CLINIC | Age: 55
End: 2020-01-30
Payer: COMMERCIAL

## 2020-01-30 VITALS
HEIGHT: 73 IN | SYSTOLIC BLOOD PRESSURE: 126 MMHG | BODY MASS INDEX: 39.76 KG/M2 | DIASTOLIC BLOOD PRESSURE: 84 MMHG | WEIGHT: 300 LBS

## 2020-01-30 DIAGNOSIS — I87.2 VENOUS INSUFFICIENCY (CHRONIC) (PERIPHERAL): ICD-10-CM

## 2020-01-30 DIAGNOSIS — Z82.49 FAMILY HISTORY OF ISCHEMIC HEART DISEASE AND OTHER DISEASES OF THE CIRCULATORY SYSTEM: ICD-10-CM

## 2020-01-30 DIAGNOSIS — M79.89 OTHER SPECIFIED SOFT TISSUE DISORDERS: ICD-10-CM

## 2020-01-30 DIAGNOSIS — S14.129A CENTRAL CORD SYNDROME AT UNSPECIFIED LVL OF CERVICAL SPINAL CORD, INITIAL ENCOUNTER: ICD-10-CM

## 2020-01-30 PROCEDURE — 99203 OFFICE O/P NEW LOW 30 MIN: CPT

## 2020-01-30 NOTE — HISTORY OF PRESENT ILLNESS
[FreeTextEntry1] : 55 y/o gentleman with h/o DM, presents for evaluation of discoloration to lower legs, was diagnosed with left popliteal vein DVT in August 2019, after he was wearing a knee brace for knee pain. He has been on Xarelto since August 27, 2019. He was admitted for left leg cellulitis 2 weeks ago, symptoms are improved. He is compliant with use of compression stockings. He had a venous duplex on 1/14/2020 that showed chronic left popliteal vein DVT.

## 2020-01-30 NOTE — ASSESSMENT
[FreeTextEntry1] : 55 y/o gentleman with h/o DM, presents for evaluation of discoloration to lower legs, was diagnosed with left popliteal vein DVT in August 2019, after he was wearing a knee brace for knee pain. He has been on Xarelto since August 27, 2019. He was admitted for left leg cellulitis 2 weeks ago, symptoms are improved. He is compliant with use of compression stockings. He had a venous duplex on 1/14/2020 that showed chronic left popliteal vein DVT.\par He has adequate anticoagulation and was instructed to stop Xarelto. I will see him back in six months for repeat venous duplex. He was advised on use of emollient and compression stockings.

## 2020-07-30 ENCOUNTER — APPOINTMENT (OUTPATIENT)
Dept: VASCULAR SURGERY | Facility: CLINIC | Age: 55
End: 2020-07-30

## 2020-12-04 ENCOUNTER — EMERGENCY (EMERGENCY)
Facility: HOSPITAL | Age: 55
LOS: 0 days | Discharge: HOME | End: 2020-12-04
Attending: EMERGENCY MEDICINE | Admitting: EMERGENCY MEDICINE
Payer: COMMERCIAL

## 2020-12-04 VITALS
TEMPERATURE: 98 F | SYSTOLIC BLOOD PRESSURE: 173 MMHG | OXYGEN SATURATION: 96 % | HEIGHT: 73 IN | HEART RATE: 84 BPM | DIASTOLIC BLOOD PRESSURE: 68 MMHG | RESPIRATION RATE: 20 BRPM

## 2020-12-04 DIAGNOSIS — Z79.899 OTHER LONG TERM (CURRENT) DRUG THERAPY: ICD-10-CM

## 2020-12-04 DIAGNOSIS — Z98.890 OTHER SPECIFIED POSTPROCEDURAL STATES: Chronic | ICD-10-CM

## 2020-12-04 DIAGNOSIS — U07.1 COVID-19: ICD-10-CM

## 2020-12-04 DIAGNOSIS — Z79.84 LONG TERM (CURRENT) USE OF ORAL HYPOGLYCEMIC DRUGS: ICD-10-CM

## 2020-12-04 DIAGNOSIS — R06.02 SHORTNESS OF BREATH: ICD-10-CM

## 2020-12-04 DIAGNOSIS — E11.9 TYPE 2 DIABETES MELLITUS WITHOUT COMPLICATIONS: ICD-10-CM

## 2020-12-04 DIAGNOSIS — Z90.49 ACQUIRED ABSENCE OF OTHER SPECIFIED PARTS OF DIGESTIVE TRACT: Chronic | ICD-10-CM

## 2020-12-04 DIAGNOSIS — I10 ESSENTIAL (PRIMARY) HYPERTENSION: ICD-10-CM

## 2020-12-04 LAB
ALBUMIN SERPL ELPH-MCNC: 3.7 G/DL — SIGNIFICANT CHANGE UP (ref 3.5–5.2)
ALP SERPL-CCNC: 51 U/L — SIGNIFICANT CHANGE UP (ref 30–115)
ALT FLD-CCNC: 36 U/L — SIGNIFICANT CHANGE UP (ref 0–41)
ANION GAP SERPL CALC-SCNC: 12 MMOL/L — SIGNIFICANT CHANGE UP (ref 7–14)
AST SERPL-CCNC: 31 U/L — SIGNIFICANT CHANGE UP (ref 0–41)
BASE EXCESS BLDV CALC-SCNC: 3.7 MMOL/L — HIGH (ref -2–2)
BASOPHILS # BLD AUTO: 0.01 K/UL — SIGNIFICANT CHANGE UP (ref 0–0.2)
BASOPHILS NFR BLD AUTO: 0.1 % — SIGNIFICANT CHANGE UP (ref 0–1)
BILIRUB SERPL-MCNC: 0.4 MG/DL — SIGNIFICANT CHANGE UP (ref 0.2–1.2)
BUN SERPL-MCNC: 17 MG/DL — SIGNIFICANT CHANGE UP (ref 10–20)
CA-I SERPL-SCNC: 1.16 MMOL/L — SIGNIFICANT CHANGE UP (ref 1.12–1.3)
CALCIUM SERPL-MCNC: 8.5 MG/DL — SIGNIFICANT CHANGE UP (ref 8.5–10.1)
CHLORIDE SERPL-SCNC: 101 MMOL/L — SIGNIFICANT CHANGE UP (ref 98–110)
CO2 SERPL-SCNC: 25 MMOL/L — SIGNIFICANT CHANGE UP (ref 17–32)
CREAT SERPL-MCNC: 1.1 MG/DL — SIGNIFICANT CHANGE UP (ref 0.7–1.5)
EOSINOPHIL # BLD AUTO: 0.05 K/UL — SIGNIFICANT CHANGE UP (ref 0–0.7)
EOSINOPHIL NFR BLD AUTO: 0.7 % — SIGNIFICANT CHANGE UP (ref 0–8)
GAS PNL BLDV: 137 MMOL/L — SIGNIFICANT CHANGE UP (ref 136–145)
GAS PNL BLDV: SIGNIFICANT CHANGE UP
GLUCOSE SERPL-MCNC: 164 MG/DL — HIGH (ref 70–99)
HCO3 BLDV-SCNC: 29 MMOL/L — SIGNIFICANT CHANGE UP (ref 22–29)
HCT VFR BLD CALC: 40 % — LOW (ref 42–52)
HCT VFR BLDA CALC: 42 % — SIGNIFICANT CHANGE UP (ref 34–44)
HGB BLD CALC-MCNC: 13.7 G/DL — LOW (ref 14–18)
HGB BLD-MCNC: 13.3 G/DL — LOW (ref 14–18)
IMM GRANULOCYTES NFR BLD AUTO: 0.4 % — HIGH (ref 0.1–0.3)
LACTATE BLDV-MCNC: 1 MMOL/L — SIGNIFICANT CHANGE UP (ref 0.5–1.6)
LDH SERPL L TO P-CCNC: 259 U/L — HIGH (ref 50–242)
LYMPHOCYTES # BLD AUTO: 1.34 K/UL — SIGNIFICANT CHANGE UP (ref 1.2–3.4)
LYMPHOCYTES # BLD AUTO: 19 % — LOW (ref 20.5–51.1)
MCHC RBC-ENTMCNC: 30.2 PG — SIGNIFICANT CHANGE UP (ref 27–31)
MCHC RBC-ENTMCNC: 33.3 G/DL — SIGNIFICANT CHANGE UP (ref 32–37)
MCV RBC AUTO: 90.7 FL — SIGNIFICANT CHANGE UP (ref 80–94)
MONOCYTES # BLD AUTO: 0.72 K/UL — HIGH (ref 0.1–0.6)
MONOCYTES NFR BLD AUTO: 10.2 % — HIGH (ref 1.7–9.3)
NEUTROPHILS # BLD AUTO: 4.9 K/UL — SIGNIFICANT CHANGE UP (ref 1.4–6.5)
NEUTROPHILS NFR BLD AUTO: 69.6 % — SIGNIFICANT CHANGE UP (ref 42.2–75.2)
NRBC # BLD: 0 /100 WBCS — SIGNIFICANT CHANGE UP (ref 0–0)
PCO2 BLDV: 46 MMHG — SIGNIFICANT CHANGE UP (ref 41–51)
PH BLDV: 7.41 — SIGNIFICANT CHANGE UP (ref 7.26–7.43)
PLATELET # BLD AUTO: 183 K/UL — SIGNIFICANT CHANGE UP (ref 130–400)
PO2 BLDV: 27 MMHG — SIGNIFICANT CHANGE UP (ref 20–40)
POTASSIUM BLDV-SCNC: 4.3 MMOL/L — SIGNIFICANT CHANGE UP (ref 3.3–5.6)
POTASSIUM SERPL-MCNC: 4.4 MMOL/L — SIGNIFICANT CHANGE UP (ref 3.5–5)
POTASSIUM SERPL-SCNC: 4.4 MMOL/L — SIGNIFICANT CHANGE UP (ref 3.5–5)
PROT SERPL-MCNC: 6.1 G/DL — SIGNIFICANT CHANGE UP (ref 6–8)
RBC # BLD: 4.41 M/UL — LOW (ref 4.7–6.1)
RBC # FLD: 11.8 % — SIGNIFICANT CHANGE UP (ref 11.5–14.5)
SAO2 % BLDV: 51 % — SIGNIFICANT CHANGE UP
SODIUM SERPL-SCNC: 138 MMOL/L — SIGNIFICANT CHANGE UP (ref 135–146)
TROPONIN T SERPL-MCNC: <0.01 NG/ML — SIGNIFICANT CHANGE UP
WBC # BLD: 7.05 K/UL — SIGNIFICANT CHANGE UP (ref 4.8–10.8)
WBC # FLD AUTO: 7.05 K/UL — SIGNIFICANT CHANGE UP (ref 4.8–10.8)

## 2020-12-04 PROCEDURE — 99285 EMERGENCY DEPT VISIT HI MDM: CPT

## 2020-12-04 PROCEDURE — 71045 X-RAY EXAM CHEST 1 VIEW: CPT | Mod: 26

## 2020-12-04 PROCEDURE — 93010 ELECTROCARDIOGRAM REPORT: CPT

## 2020-12-04 NOTE — ED PROVIDER NOTE - NSFOLLOWUPINSTRUCTIONS_ED_ALL_ED_FT
You have tested POSITIVE for the novel coronavirus (COVID-19). Upon discharge, you must self-quarantine for 14 days, or until the Department of Health contacts you. Please wear a face mask if you are around other individuals. Try to avoid contact with house members, family, and friends for the duration of this quarantine. Please follow up with your primary care physician within 2-3 weeks of your discharge from the hospital. Please take all medications as prescribed. If you experience any worsening or recurrence of your symptoms, particularly worsening or high fever, shortness of breathe, extreme fatigue, or bloody cough please call 9-1-1 immediately or report to the nearest Emergency Department. If you have any questions or concerns, please do not hesitate to call the hospital at [phone].    Shortness of Breath, Adult  Shortness of breath is when a person has trouble breathing enough air, or when a person feels like she or he is having trouble breathing in enough air. Shortness of breath could be a sign of medical problem.    Follow these instructions at home:  Pay attention to any changes in your symptoms. Take these actions to help with your condition:    Do not smoke. Smoking is a common cause of shortness of breath. If you smoke and you need help quitting, ask your health care provider.  Avoid things that can irritate your airways, such as:    Mold.  Dust.  Air pollution.  Chemical fumes.  Things that can cause allergy symptoms (allergens), if you have allergies.    Keep your living space clean and free of mold and dust.  Rest as needed. Slowly return to your usual activities.  Take over-the-counter and prescription medicines, including oxygen and inhaled medicines, only as told by your health care provider.  Keep all follow-up visits as told by your health care provider. This is important.    Contact a health care provider if:  Your condition does not improve as soon as expected.  You have a hard time doing your normal activities, even after you rest.  You have new symptoms.  Get help right away if:  Your shortness of breath gets worse.  You have shortness of breath when you are resting.  You feel light-headed or you faint.  You have a cough that is not controlled with medicines.  You cough up blood.  You have pain with breathing.  You have pain in your chest, arms, shoulders, or abdomen.  You have a fever.  You cannot walk up stairs or exercise the way that you normally do.  This information is not intended to replace advice given to you by your health care provider. Make sure you discuss any questions you have with your health care provider.

## 2020-12-04 NOTE — ED PROVIDER NOTE - PROGRESS NOTE DETAILS
AA: Labs WNL. CXR demonstrating b/l opacities. Discussion w/ pt regarding staying in hospital for observation or going home and returning if worse. pt would rather go home, has 3 children who can assist in watching him and has pulse ox. will add pt to teams list. Return precautions given to pt to return for worsening s/s, CP, SOB, lightheadedness, syncope, lethargy.

## 2020-12-04 NOTE — ED PROVIDER NOTE - NS ED ROS FT
General: No fever, chills, or weakness.  Eyes:  No visual changes, eye pain or discharge.  ENMT:  No hearing changes, pain, no sore throat or runny nose, no difficulty swallowing  Cardiac:  No chest pain, edema. No chest pain with exertion.  Respiratory: Dyspnea. dry cough.  GI:  No nausea, vomiting, diarrhea or abdominal pain.  :  No dysuria, frequency or burning.  MS:  No myalgia, muscle weakness, joint pain or back pain.  Neuro:  No headache.  No LOC. No change in ambulation. No dizziness.  Skin:  No skin rash.

## 2020-12-04 NOTE — ED ADULT NURSE NOTE - OBJECTIVE STATEMENT
Patient has had sob since friday, was 81 on room air for 20 minutes at home today. Tested positive for covid on Wednesday.

## 2020-12-04 NOTE — ED PROVIDER NOTE - OBJECTIVE STATEMENT
55m h/o DM Type 2, HTN, obesity p/w dyspnea. Tested (+) for COVID in the past couple of days. Dyspnea is mild, worse on exertion. Symptom onset 8 days ago. admits to dry cough. denies fever/chills, cp, abd pain, n/v/d, lethargy. pt had pulse ox on home that read in the 80s and came in.

## 2020-12-04 NOTE — ED PROVIDER NOTE - CLINICAL SUMMARY MEDICAL DECISION MAKING FREE TEXT BOX
56 yo man with multiple risk factors, active COVID with mild SOB.  However, normal work of breathing and no evidence of hypoxia in ED.  After long discussion, he prefers discharge and will return for any new or worsening symptoms.

## 2020-12-04 NOTE — ED PROVIDER NOTE - PATIENT PORTAL LINK FT
You can access the FollowMyHealth Patient Portal offered by [facility] by registering at the following website: [email or web address]. By joining [employer]’s FollowMyHealth portal, you will also be able to view your health information using other applications (apps) compatible with our system.

## 2020-12-04 NOTE — ED PROVIDER NOTE - ATTENDING CONTRIBUTION TO CARE
I personally evaluated the patient. I reviewed the Resident’s or Physician Assistant’s note (as assigned above), and agree with the findings and plan except as documented in my note.  56 yo man with known COVID presents due to pulse ox at home with reading that was low and also mild SOB.  In ED, pulse ox is slightly low on RA at 94% but improved relatively quickly with no intervention.  Workup revealed bilateral infiltrates and typical lab abnormalities.  He has a normal work of breathing.  After discussion with patient, he prefers discharge rather than admission.  Will monitor at home and return for any new or worsening.

## 2020-12-04 NOTE — ED PROVIDER NOTE - HIV OFFER
Previously Declined (within the last year) Graft Donor Site Bandage (Optional-Leave Blank If You Don't Want In Note): Steri-strips and a pressure bandage were applied to the donor site.

## 2020-12-04 NOTE — ED ADULT TRIAGE NOTE - CHIEF COMPLAINT QUOTE
Patient tested COVID positive on Wednesday. Has had sob since Friday which worsened today. States " my pulse ox went down to 81 while sitting."

## 2020-12-05 LAB
CRP SERPL-MCNC: 5.25 MG/DL — HIGH (ref 0–0.4)
PROCALCITONIN SERPL-MCNC: 0.03 NG/ML — SIGNIFICANT CHANGE UP (ref 0.02–0.1)

## 2021-01-01 NOTE — ED ADULT NURSE NOTE - NS ED NOTE ABUSE SUSPICION NEGLECT YN
Problem:  CARE  Goal: Vital signs are medically acceptable  2021 1010 by Emerald Hoang RN  Outcome: Ongoing  Note: Vs stable      Problem:  CARE  Goal: Thermoregulation maintained greater than 97/less than 99.4 Ax  2021 1010 by Emerald Hoang RN  Outcome: Ongoing  Note: Vs stable      Problem:  CARE  Goal: Infant exhibits minimal/reduced signs of pain/discomfort  2021 1010 by Emerald Hoang RN  Outcome: Ongoing  Note: See nips      Problem:  CARE  Goal: Infant is maintained in safe environment  2021 1010 by Emerald Hoang RN  Outcome: Ongoing  Note: Infant banded     Problem:  CARE  Goal: Baby is with Mother and family  2021 1010 by Emerald Hoang RN  Outcome: Ongoing  Note: Bonding well      Problem: Discharge Planning:  Goal: Discharged to appropriate level of care  Description: Discharged to appropriate level of care  2021 1010 by Emerald Hoang RN  Outcome: Ongoing  Note: Remains in SCN      Problem: Gas Exchange - Impaired:  Goal: Levels of oxygenation will improve  Description: Levels of oxygenation will improve  2021 1010 by Emerald Hoang RN  Outcome: Ongoing  Note: Remains on oxygen      Problem: Growth and Development - Risk of, Impaired:  Goal: Demonstration of normal  growth will improve to within specified parameters  Description: Demonstration of normal  growth will improve to within specified parameters  2021 1010 by Emerald Hoang RN  Outcome: Ongoing  Note: See assessments   Plan of care reviewed with mother and/or legal guardian. Questions & concerns addressed with verbalized understanding from mother and/or legal guardian. Mother and/or legal guardian participated in goal setting for their baby. No

## 2021-02-10 NOTE — PROGRESS NOTE ADULT - ASSESSMENT
53 year old male with history of HTN, DM on Glipizide presents to ED for worsening leg swelling and pain after being treated for cellulitis 1 week ago with Keflex.       # Acute L popliteal DVT  # Left leg cellulitis, failed outpatient treatment   now on IV clindamycin ;  ID consult indicates cont clinda for now;   the DVT may be the premier cause of his symptoms  start anticoag w Xarelto    # DM   on Glipizide at home   monitor FS      # HTN   c/W ramipril > lisinopril         #Progress Note Handoff    Pending (specify):  monitor for symptom improvement in next 24-48hrs then possible dispo thereafter    Family discussion: na    Disposition: Home_x__/SNF___/Other________/Unknown at this time________ Implemented All Universal Safety Interventions:  [place] to call system. Call bell, personal items and telephone within reach. Instruct patient to call for assistance. Room bathroom lighting operational. Non-slip footwear when patient is off stretcher. Physically safe environment: no spills, clutter or unnecessary equipment. Stretcher in lowest position, wheels locked, appropriate side rails in place.

## 2021-02-17 ENCOUNTER — OUTPATIENT (OUTPATIENT)
Dept: OUTPATIENT SERVICES | Facility: HOSPITAL | Age: 56
LOS: 1 days | Discharge: HOME | End: 2021-02-17
Payer: COMMERCIAL

## 2021-02-17 DIAGNOSIS — Z98.890 OTHER SPECIFIED POSTPROCEDURAL STATES: Chronic | ICD-10-CM

## 2021-02-17 DIAGNOSIS — R91.8 OTHER NONSPECIFIC ABNORMAL FINDING OF LUNG FIELD: ICD-10-CM

## 2021-02-17 DIAGNOSIS — Z86.16 PERSONAL HISTORY OF COVID-19: ICD-10-CM

## 2021-02-17 DIAGNOSIS — Z90.49 ACQUIRED ABSENCE OF OTHER SPECIFIED PARTS OF DIGESTIVE TRACT: Chronic | ICD-10-CM

## 2021-02-17 PROCEDURE — 71046 X-RAY EXAM CHEST 2 VIEWS: CPT | Mod: 26

## 2021-04-10 DIAGNOSIS — Z86.718 PERSONAL HISTORY OF OTHER VENOUS THROMBOSIS AND EMBOLISM: ICD-10-CM

## 2021-05-18 ENCOUNTER — APPOINTMENT (OUTPATIENT)
Dept: ENDOCRINOLOGY | Facility: CLINIC | Age: 56
End: 2021-05-18
Payer: COMMERCIAL

## 2021-05-18 VITALS
WEIGHT: 307 LBS | HEART RATE: 84 BPM | BODY MASS INDEX: 40.69 KG/M2 | OXYGEN SATURATION: 97 % | TEMPERATURE: 97.8 F | SYSTOLIC BLOOD PRESSURE: 134 MMHG | HEIGHT: 73 IN | DIASTOLIC BLOOD PRESSURE: 84 MMHG

## 2021-05-18 DIAGNOSIS — Z78.9 OTHER SPECIFIED HEALTH STATUS: ICD-10-CM

## 2021-05-18 DIAGNOSIS — Z86.79 PERSONAL HISTORY OF OTHER DISEASES OF THE CIRCULATORY SYSTEM: ICD-10-CM

## 2021-05-18 DIAGNOSIS — Z83.3 FAMILY HISTORY OF DIABETES MELLITUS: ICD-10-CM

## 2021-05-18 LAB — GLUCOSE BLDC GLUCOMTR-MCNC: 150

## 2021-05-18 PROCEDURE — 99203 OFFICE O/P NEW LOW 30 MIN: CPT

## 2021-05-18 PROCEDURE — 82962 GLUCOSE BLOOD TEST: CPT

## 2021-05-18 RX ORDER — RAMIPRIL 10 MG/1
10 CAPSULE ORAL
Refills: 0 | Status: ACTIVE | COMMUNITY

## 2021-05-18 NOTE — HISTORY OF PRESENT ILLNESS
[FreeTextEntry1] : Mr. OCTAVIA GRAY  Is  a 55 year  old male  who presented for evaluation of type 2 Diabetes:\par \par \par Diagnosis in 2018 \par Current Regimen: Metformin 500 mg 1 tablet BID , Glipizide 10 mg in am \par Previous regimens: none \par Compliance:  good \par SMBG: daily in am 145-155  occasionally in evening 120-125 \par Hypoglycemia:  none, no symptoms too  \par Polyuria/polydipsia :  + nocturia twice at night \par Weight change/BMI: gaining weight again \par Diet: 2-3 meals/day \par Exercise: walking on days off, and work days constantly moving and active \par HBa1c trend: was 7% then down to  6 % (2/2021)\par \par \par Prevention \par Statin: none \par ACE/ARB : ramipril 10 mg daily \par Eye examination: not done recently \par Neuropathy: follows with podiatry and vascular had previous left DVT and wear compression stocking , also has left Hammer toe \par PIERO: negative \par  no CAD no stroke

## 2021-05-18 NOTE — ASSESSMENT
[Diabetes Foot Care] : diabetes foot care [Long Term Vascular Complications] : long term vascular complications of diabetes [Carbohydrate Consistent Diet] : carbohydrate consistent diet [Importance of Diet and Exercise] : importance of diet and exercise to improve glycemic control, achieve weight loss and improve cardiovascular health [Exercise/Effect on Glucose] : exercise/effect on glucose [Hypoglycemia Management] : hypoglycemia management [Self Monitoring of Blood Glucose] : self monitoring of blood glucose [Retinopathy Screening] : Patient was referred to ophthalmology for retinopathy screening [Weight Loss] : weight loss [FreeTextEntry1] : Mr. OCTAVIA GRAY  Is  a 55 year  old male  who presented for evaluation of type 2 Diabetes/ obesity \par \par \par #Controlled type 2 DM  \par 2/2021 : HBa1c 6% on  Metformin 500 mg 1 tablet BID , Glipizide 10 mg in am\par - discussed target hba1 6-6.5%, concern with glipizide is hypoglycemia and weight gain \par - discussed switching to GLP1 agonist and CV benefits as well as weight loss benefits , discussed SE and titration \par - Start Trulicity 0.75 mg Q week and stop glipizide by the time yo utake second injection \par - increase metformin to 1000 mg BID \par - LDL 89 , will recheck levels and start statin \par - Continue ramipril \par - f/u opthalmology and podiatry and with PCP/surgeon regarding back of neck lump \par \par labs and f/u in 3 months \par

## 2021-05-18 NOTE — REVIEW OF SYSTEMS
[Fatigue] : no fatigue [Recent Weight Gain (___ Lbs)] : recent weight gain: [unfilled] lbs [Recent Weight Loss (___ Lbs)] : no recent weight loss [Fever] : no fever [Blurred Vision] : no blurred vision [Dysphagia] : no dysphagia [Neck Pain] : no neck pain [Dysphonia] : no dysphonia [Chest Pain] : no chest pain [Slow Heart Rate] : heart rate is not slow [Palpitations] : no palpitations [Lower Ext Edema] : lower extremity edema [Shortness Of Breath] : no shortness of breath [Cough] : no cough [SOB on Exertion] : no shortness of breath on exertion [Nausea] : no nausea [Constipation] : no constipation [Vomiting] : no vomiting [Diarrhea] : no diarrhea [Polyuria] : polyuria [Nocturia] : nocturia [Hesistancy] : no hesitancy [Muscle Weakness] : no muscle weakness [Myalgia] : no myalgia  [Headaches] : no headaches [Dizziness] : no dizziness [Tremors] : no tremors [Polydipsia] : polydipsia [Cold Intolerance] : no cold intolerance [Heat Intolerance] : no heat intolerance

## 2021-11-15 ENCOUNTER — APPOINTMENT (OUTPATIENT)
Dept: ENDOCRINOLOGY | Facility: CLINIC | Age: 56
End: 2021-11-15
Payer: COMMERCIAL

## 2021-11-15 VITALS
WEIGHT: 305 LBS | SYSTOLIC BLOOD PRESSURE: 152 MMHG | DIASTOLIC BLOOD PRESSURE: 88 MMHG | HEART RATE: 76 BPM | OXYGEN SATURATION: 98 % | BODY MASS INDEX: 40.42 KG/M2 | TEMPERATURE: 97.1 F | HEIGHT: 73 IN

## 2021-11-15 PROCEDURE — 99213 OFFICE O/P EST LOW 20 MIN: CPT

## 2021-11-15 RX ORDER — GLIPIZIDE 2.5 MG/1
TABLET ORAL
Refills: 0 | Status: DISCONTINUED | COMMUNITY
End: 2021-11-15

## 2021-11-15 NOTE — REVIEW OF SYSTEMS
[Lower Ext Edema] : lower extremity edema [Polyuria] : polyuria [Nocturia] : nocturia [Polydipsia] : polydipsia [Fatigue] : no fatigue [Recent Weight Gain (___ Lbs)] : no recent weight gain [Recent Weight Loss (___ Lbs)] : no recent weight loss [Fever] : no fever [Blurred Vision] : no blurred vision [Dysphagia] : no dysphagia [Neck Pain] : no neck pain [Dysphonia] : no dysphonia [Chest Pain] : no chest pain [Slow Heart Rate] : heart rate is not slow [Palpitations] : no palpitations [Shortness Of Breath] : no shortness of breath [Cough] : no cough [SOB on Exertion] : no shortness of breath on exertion [Nausea] : no nausea [Constipation] : no constipation [Vomiting] : no vomiting [Diarrhea] : no diarrhea [Hesistancy] : no hesitancy [Muscle Weakness] : no muscle weakness [Myalgia] : no myalgia  [Headaches] : no headaches [Dizziness] : no dizziness [Tremors] : no tremors [Cold Intolerance] : no cold intolerance [Heat Intolerance] : no heat intolerance

## 2021-11-15 NOTE — ASSESSMENT
[Diabetes Foot Care] : diabetes foot care [Long Term Vascular Complications] : long term vascular complications of diabetes [Carbohydrate Consistent Diet] : carbohydrate consistent diet [Importance of Diet and Exercise] : importance of diet and exercise to improve glycemic control, achieve weight loss and improve cardiovascular health [Exercise/Effect on Glucose] : exercise/effect on glucose [Hypoglycemia Management] : hypoglycemia management [Self Monitoring of Blood Glucose] : self monitoring of blood glucose [Retinopathy Screening] : Patient was referred to ophthalmology for retinopathy screening [Weight Loss] : weight loss [FreeTextEntry1] : Mr. OCTAVIA GRAY  Is  a 56 year  old male  who presented for follow up evaluation of type 2 Diabetes/ obesity \par \par \par #Controlled type 2 DM  \par 2/2021 : HBa1c 6% now up to 6.4%  on  Metformin 1000 mg 1 tablet BID , trulicity 0.75 mg Q week likely due to non compliance with diet past few months \par - increase Trulicity to 1.5  mg Q week \par - Continue metformin 1000 mg BID \par - LDL above target , hesitant to start statin  , will recheck levels and start statin if remain high \par  - f/u opthalmology and podiatry \par - blood work with high potassium , patient was informed by PCP and was told to watch diet , i offered to do labs today to confirm no potassium problem but patient did not want to redo labs \par \par labs and f/u in 6 months \par

## 2021-11-15 NOTE — THERAPY
[Today's Date] : [unfilled] [FreeTextEntry7] : metformin 1000 mg BID \par trulicity 0.75 mg Q week \par

## 2021-11-15 NOTE — DATA REVIEWED
[FreeTextEntry1] : 2/2021: HBa1c 6%, alb/crea 11 LDL 89 Tg 95  crea 1.03  tsh 1.59\par 11/6/21: HBA1c 6.4% AST 39 ALT 31 K 6 GFR 80 crea 1.05     TSH 1.53  FT4 1.2

## 2021-11-15 NOTE — HISTORY OF PRESENT ILLNESS
[FreeTextEntry1] : Mr. OCTAVIA GRAY  Is  a 56 year  old male  who presented for follow up evaluation of type 2 Diabetes:\par \par \par Diagnosis in 2018 \par Current Regimen: Metformin 1000 mg 1 tablet BID , last visit started on Trulicity 0.75 mg Q week  and stopped glipizide \par Previous regimens: none \par Compliance:  good \par SMBG: daily in am 130-160  \par Hypoglycemia:  none, no symptoms too  \par Polyuria/polydipsia :  + nocturia twice at night \par Weight change/BMI: lost 2 lbs since last visit \par Diet: 2-3 meals/day , had lots of events and was not very compliant \par Exercise: walking on days off, and work days constantly moving and active \par HBa1c trend: 6.4% (11/2021) up from 6% (2/2021) \par \par \par Prevention \par Statin: none \par ACE/ARB : ramipril 10 mg daily \par Eye examination: not done recently \par Neuropathy: follows with podiatry and vascular had previous left DVT and wear compression stocking , also has left Hammer toe \par PIERO: negative \par  no CAD no stroke \par \par

## 2021-11-15 NOTE — PHYSICAL EXAM
[Alert] : alert [Well Nourished] : well nourished [Healthy Appearance] : healthy appearance [Obese] : obese [No Acute Distress] : no acute distress [No Proptosis] : no proptosis [No Lid Lag] : no lid lag [Thyroid Not Enlarged] : the thyroid was not enlarged [No Thyroid Nodules] : no palpable thyroid nodules [No Accessory Muscle Use] : no accessory muscle use [Clear to Auscultation] : lungs were clear to auscultation bilaterally [Normal S1, S2] : normal S1 and S2 [No Murmurs] : no murmurs [Regular Rhythm] : with a regular rhythm [Not Tender] : non-tender [Not Distended] : not distended [Soft] : abdomen soft [No Tremors] : no tremors [Oriented x3] : oriented to person, place, and time [Acanthosis Nigricans] : no acanthosis nigricans [de-identified] : Back of the neck , lump, (had previous abscesses ) not red , not inflamed  [de-identified] : + edema, chronic skin changes

## 2021-12-01 NOTE — DISCHARGE NOTE PROVIDER - NSDCQMAMI_CARD_ALL_CORE
Goal Outcome Evaluation:  Plan of Care Reviewed With: patient, spouse        Progress: improving  Outcome Summary: VSS, pain managable, ambulated in the room once with assistance, ehredia in place with adequate urine output, dressings dry and intact, intermittent nausea noted. Will continue to monitor.   No

## 2022-03-08 ENCOUNTER — INPATIENT (INPATIENT)
Facility: HOSPITAL | Age: 57
LOS: 0 days | Discharge: HOME | End: 2022-03-09
Attending: HOSPITALIST | Admitting: HOSPITALIST
Payer: COMMERCIAL

## 2022-03-08 VITALS
HEART RATE: 77 BPM | WEIGHT: 309.97 LBS | DIASTOLIC BLOOD PRESSURE: 65 MMHG | RESPIRATION RATE: 18 BRPM | OXYGEN SATURATION: 100 % | HEIGHT: 73 IN | TEMPERATURE: 97 F | SYSTOLIC BLOOD PRESSURE: 134 MMHG

## 2022-03-08 DIAGNOSIS — Z98.890 OTHER SPECIFIED POSTPROCEDURAL STATES: Chronic | ICD-10-CM

## 2022-03-08 DIAGNOSIS — Z90.49 ACQUIRED ABSENCE OF OTHER SPECIFIED PARTS OF DIGESTIVE TRACT: Chronic | ICD-10-CM

## 2022-03-08 LAB
ALBUMIN SERPL ELPH-MCNC: 4.6 G/DL — SIGNIFICANT CHANGE UP (ref 3.5–5.2)
ALBUMIN SERPL ELPH-MCNC: 4.8 G/DL — SIGNIFICANT CHANGE UP (ref 3.5–5.2)
ALP SERPL-CCNC: 45 U/L — SIGNIFICANT CHANGE UP (ref 30–115)
ALP SERPL-CCNC: 53 U/L — SIGNIFICANT CHANGE UP (ref 30–115)
ALT FLD-CCNC: 32 U/L — SIGNIFICANT CHANGE UP (ref 0–41)
ALT FLD-CCNC: 36 U/L — SIGNIFICANT CHANGE UP (ref 0–41)
ANION GAP SERPL CALC-SCNC: 16 MMOL/L — HIGH (ref 7–14)
ANION GAP SERPL CALC-SCNC: 17 MMOL/L — HIGH (ref 7–14)
APPEARANCE UR: CLEAR — SIGNIFICANT CHANGE UP
AST SERPL-CCNC: 23 U/L — SIGNIFICANT CHANGE UP (ref 0–41)
AST SERPL-CCNC: 59 U/L — HIGH (ref 0–41)
BASOPHILS # BLD AUTO: 0.04 K/UL — SIGNIFICANT CHANGE UP (ref 0–0.2)
BASOPHILS NFR BLD AUTO: 0.4 % — SIGNIFICANT CHANGE UP (ref 0–1)
BILIRUB SERPL-MCNC: 0.5 MG/DL — SIGNIFICANT CHANGE UP (ref 0.2–1.2)
BILIRUB SERPL-MCNC: 0.6 MG/DL — SIGNIFICANT CHANGE UP (ref 0.2–1.2)
BILIRUB UR-MCNC: NEGATIVE — SIGNIFICANT CHANGE UP
BUN SERPL-MCNC: 50 MG/DL — HIGH (ref 10–20)
BUN SERPL-MCNC: 51 MG/DL — HIGH (ref 10–20)
CALCIUM SERPL-MCNC: 9.3 MG/DL — SIGNIFICANT CHANGE UP (ref 8.5–10.1)
CALCIUM SERPL-MCNC: 9.5 MG/DL — SIGNIFICANT CHANGE UP (ref 8.5–10.1)
CHLORIDE SERPL-SCNC: 95 MMOL/L — LOW (ref 98–110)
CHLORIDE SERPL-SCNC: 96 MMOL/L — LOW (ref 98–110)
CO2 SERPL-SCNC: 17 MMOL/L — SIGNIFICANT CHANGE UP (ref 17–32)
CO2 SERPL-SCNC: 18 MMOL/L — SIGNIFICANT CHANGE UP (ref 17–32)
COLOR SPEC: COLORLESS — SIGNIFICANT CHANGE UP
CREAT SERPL-MCNC: 1.8 MG/DL — HIGH (ref 0.7–1.5)
CREAT SERPL-MCNC: 1.9 MG/DL — HIGH (ref 0.7–1.5)
DIFF PNL FLD: NEGATIVE — SIGNIFICANT CHANGE UP
EGFR: 41 ML/MIN/1.73M2 — LOW
EGFR: 44 ML/MIN/1.73M2 — LOW
EOSINOPHIL # BLD AUTO: 0.17 K/UL — SIGNIFICANT CHANGE UP (ref 0–0.7)
EOSINOPHIL NFR BLD AUTO: 1.6 % — SIGNIFICANT CHANGE UP (ref 0–8)
GLUCOSE SERPL-MCNC: 101 MG/DL — HIGH (ref 70–99)
GLUCOSE SERPL-MCNC: 98 MG/DL — SIGNIFICANT CHANGE UP (ref 70–99)
GLUCOSE UR QL: NEGATIVE — SIGNIFICANT CHANGE UP
HCT VFR BLD CALC: 44.2 % — SIGNIFICANT CHANGE UP (ref 42–52)
HGB BLD-MCNC: 14.9 G/DL — SIGNIFICANT CHANGE UP (ref 14–18)
IMM GRANULOCYTES NFR BLD AUTO: 0.4 % — HIGH (ref 0.1–0.3)
KETONES UR-MCNC: NEGATIVE — SIGNIFICANT CHANGE UP
LEUKOCYTE ESTERASE UR-ACNC: NEGATIVE — SIGNIFICANT CHANGE UP
LYMPHOCYTES # BLD AUTO: 2.89 K/UL — SIGNIFICANT CHANGE UP (ref 1.2–3.4)
LYMPHOCYTES # BLD AUTO: 27.8 % — SIGNIFICANT CHANGE UP (ref 20.5–51.1)
MAGNESIUM SERPL-MCNC: 1.9 MG/DL — SIGNIFICANT CHANGE UP (ref 1.8–2.4)
MCHC RBC-ENTMCNC: 29.8 PG — SIGNIFICANT CHANGE UP (ref 27–31)
MCHC RBC-ENTMCNC: 33.7 G/DL — SIGNIFICANT CHANGE UP (ref 32–37)
MCV RBC AUTO: 88.4 FL — SIGNIFICANT CHANGE UP (ref 80–94)
MONOCYTES # BLD AUTO: 1.03 K/UL — HIGH (ref 0.1–0.6)
MONOCYTES NFR BLD AUTO: 9.9 % — HIGH (ref 1.7–9.3)
NEUTROPHILS # BLD AUTO: 6.23 K/UL — SIGNIFICANT CHANGE UP (ref 1.4–6.5)
NEUTROPHILS NFR BLD AUTO: 59.9 % — SIGNIFICANT CHANGE UP (ref 42.2–75.2)
NITRITE UR-MCNC: NEGATIVE — SIGNIFICANT CHANGE UP
NRBC # BLD: 0 /100 WBCS — SIGNIFICANT CHANGE UP (ref 0–0)
PH UR: 6 — SIGNIFICANT CHANGE UP (ref 5–8)
PLATELET # BLD AUTO: 236 K/UL — SIGNIFICANT CHANGE UP (ref 130–400)
POTASSIUM SERPL-MCNC: 5.2 MMOL/L — HIGH (ref 3.5–5)
POTASSIUM SERPL-MCNC: SIGNIFICANT CHANGE UP MMOL/L (ref 3.5–5)
POTASSIUM SERPL-SCNC: 5.2 MMOL/L — HIGH (ref 3.5–5)
POTASSIUM SERPL-SCNC: SIGNIFICANT CHANGE UP MMOL/L (ref 3.5–5)
PROT SERPL-MCNC: 7.3 G/DL — SIGNIFICANT CHANGE UP (ref 6–8)
PROT SERPL-MCNC: 8.2 G/DL — HIGH (ref 6–8)
PROT UR-MCNC: NEGATIVE — SIGNIFICANT CHANGE UP
RBC # BLD: 5 M/UL — SIGNIFICANT CHANGE UP (ref 4.7–6.1)
RBC # FLD: 12.2 % — SIGNIFICANT CHANGE UP (ref 11.5–14.5)
SODIUM SERPL-SCNC: 129 MMOL/L — LOW (ref 135–146)
SODIUM SERPL-SCNC: 130 MMOL/L — LOW (ref 135–146)
SP GR SPEC: 1.02 — SIGNIFICANT CHANGE UP (ref 1.01–1.03)
TROPONIN T SERPL-MCNC: <0.01 NG/ML — SIGNIFICANT CHANGE UP
TROPONIN T SERPL-MCNC: <0.01 NG/ML — SIGNIFICANT CHANGE UP
UROBILINOGEN FLD QL: SIGNIFICANT CHANGE UP
WBC # BLD: 10.4 K/UL — SIGNIFICANT CHANGE UP (ref 4.8–10.8)
WBC # FLD AUTO: 10.4 K/UL — SIGNIFICANT CHANGE UP (ref 4.8–10.8)

## 2022-03-08 PROCEDURE — 71046 X-RAY EXAM CHEST 2 VIEWS: CPT | Mod: 26

## 2022-03-08 PROCEDURE — 74176 CT ABD & PELVIS W/O CONTRAST: CPT | Mod: 26,MA

## 2022-03-08 PROCEDURE — 93010 ELECTROCARDIOGRAM REPORT: CPT | Mod: 77

## 2022-03-08 PROCEDURE — 70496 CT ANGIOGRAPHY HEAD: CPT | Mod: 26,MA

## 2022-03-08 PROCEDURE — 99285 EMERGENCY DEPT VISIT HI MDM: CPT

## 2022-03-08 PROCEDURE — 93010 ELECTROCARDIOGRAM REPORT: CPT

## 2022-03-08 PROCEDURE — 70498 CT ANGIOGRAPHY NECK: CPT | Mod: 26,MA

## 2022-03-08 RX ORDER — MECLIZINE HCL 12.5 MG
50 TABLET ORAL ONCE
Refills: 0 | Status: COMPLETED | OUTPATIENT
Start: 2022-03-08 | End: 2022-03-08

## 2022-03-08 RX ORDER — SODIUM CHLORIDE 9 MG/ML
1000 INJECTION, SOLUTION INTRAVENOUS ONCE
Refills: 0 | Status: COMPLETED | OUTPATIENT
Start: 2022-03-08 | End: 2022-03-08

## 2022-03-08 RX ORDER — RIVAROXABAN 15 MG-20MG
2.5 KIT ORAL
Refills: 0 | Status: DISCONTINUED | OUTPATIENT
Start: 2022-03-08 | End: 2022-03-09

## 2022-03-08 RX ADMIN — Medication 50 MILLIGRAM(S): at 14:51

## 2022-03-08 RX ADMIN — SODIUM CHLORIDE 1000 MILLILITER(S): 9 INJECTION, SOLUTION INTRAVENOUS at 14:52

## 2022-03-08 NOTE — ED ADULT TRIAGE NOTE - CHIEF COMPLAINT QUOTE
Pt reports dizziness, chest tightness, back pain and nausea. Symptoms started about a week ago, but chest pain started this morning. Pt states that he had an EKG 3 weeks ago and there was an abnormality it in and has a f/u with Cardio soon. PT has no personal or familial cardiac hx.

## 2022-03-08 NOTE — ED PROVIDER NOTE - OBJECTIVE STATEMENT
55 yo M with pmhx of traumatic cord syndrome resolved now, DM, HTN, baseline nystagmus presenting with multiple complaints. Patient reports dizziness for the last 3 weeks. Describes dizziness as the room is spinning. Patient also reports diffuse back pain that worsened today and describes an abdominal discomfort with some sob. Symptoms are moderate. No alleviating/aggravating factors. No fever, chills, abdominal pain, nausea, vomiting, diarrhea, urinary symptoms, headache, paresthesias, or weakness.

## 2022-03-08 NOTE — ED PROVIDER NOTE - ATTENDING CONTRIBUTION TO CARE
56yoM with h/o traumatic central cord syndrome now resolved, baseline nystagmus, DM, HTN, presents with multiple complaints. Reports room-spinning dizziness x 3 wks, worsened with ambulation. Also having generalized back pain without focality x 2 wks, worsened today. 7AM today had midsternal chest heaviness nonradiating, some SOB as well. Denies fever, cough, LE edema, recent long distance travel, and all other symptoms. Seen by PMD for this dizziness, then neuro and had carotid duplex yesterday and other testing planned and unknown results for this. On exam, afebrile, hemodynamically stable, saturating well, NAD, well appearing, sitting comfortably in bed, no WOB, speaking full sentences, head NCAT, pupils equal, EOMI, anicteric, MMM, no JVD, RRR, nml S1/S2, no m/r/g, lungs CTAB, no w/r/r, abd soft, NT, ND, nml BS, no rebound or guarding, no CVAT, AAO, CN's 3-12 intact, motor 5/5 and sens symm in all extrems, DAMON spontaneously, appearance of PVD with dry skin, skin warm. Character low suspicion for CVA and no focal or localizing findings. No arrhythmia or e/o aortic outflow obstruction. No anemia or bleeding. Character low suspicion for dissection and CTA negative for this. Character low suspicion for PE and no e/o DVT or tachycardia/hypoxia. Noted YOANNA. Patient well appearing, hemodynamically stable, no WOB. Admitted to internal medicine for further monitoring, w/u, and care. 56yoM with h/o traumatic central cord syndrome now resolved, baseline nystagmus, DM, HTN, presents with multiple complaints. Reports room-spinning dizziness x 3 wks, worsened with ambulation. Also having generalized back pain without focality x 2 wks, worsened today. 7AM today had midsternal chest heaviness nonradiating, some SOB as well. Denies fever, cough, LE edema, recent long distance travel, and all other symptoms. Seen by PMD for this dizziness, then neuro and had carotid duplex yesterday and other testing planned and unknown results for this. On exam, afebrile, hemodynamically stable, saturating well, NAD, well appearing, sitting comfortably in bed, no WOB, speaking full sentences, head NCAT, pupils equal, EOMI, anicteric, MMM, no JVD, RRR, nml S1/S2, no m/r/g, lungs CTAB, no w/r/r, abd soft, NT, ND, nml BS, no rebound or guarding, no CVAT, AAO, CN's 3-12 intact, motor 5/5 and sens symm in all extrems, DAMON spontaneously, appearance of PVD with dry skin, skin warm. Character low suspicion for CVA and no focal or localizing findings. No arrhythmia or e/o aortic outflow obstruction. No anemia or bleeding. Character low suspicion for dissection and CTA negative for this. Character low suspicion for PE and no e/o DVT or tachycardia/hypoxia. Noted YOANNA and will obtain UA, CT noncon to r/o stone. Patient well appearing, hemodynamically stable, no WOB. Signed off care to Dr. MARIA C Almaraz who will f/u CT and UA.

## 2022-03-08 NOTE — H&P ADULT - HISTORY OF PRESENT ILLNESS
Mr. Jones, 56 year old M with PMHx of traumatic central cord syndrome now resolved, baseline nystagmus, DM, HTN, presents with multiple complaints. Patient reports room-spinning dizziness x 3 wks, worsened with ambulation. Also having generalized back pain without focality x 2 wks, worsened today. Since 7AM today had midsternal chest heaviness nonradiating, worse on inspiration Denies fever, cough, LE edema, recent long distance travel, and all other symptoms.     In the ED, vitals BP: 134/65 HR:77 RR18 spo2 100% on RA, 96.7 f. Labs significant for Na 129, creat 1.9 (baseline 0.8-1), K 5.2, trop negative x2. CT Abdomen/Pelvis: Delayed nephrograms bilaterally compatible with nonspecific nephropathy. No hydronephrosis bilaterally or obstructing ureteral calculus. CTA head/neck: No large vessel occlusion, aneurysm or vascular malformation.Effacement of the left piriform sinus without evidence of discrete mass. Redemonstrated superficial subcutaneous cystic lesion measuring 1.7 cm in the mid posterior neck could reflect a sebaceous cyst versus inclusion cyst. CXR: No radiographic evidence of acute cardiopulmonary disease. Patient given LR 1 L bolus and meclizine in the ED. Currently admitted to medicine for further workup.

## 2022-03-08 NOTE — H&P ADULT - NSHPLABSRESULTS_GEN_ALL_CORE
<<<<<LABS>>>>>                        14.9   10.40 )-----------( 236      ( 08 Mar 2022 12:41 )             44.2     03-    129<L>  |  95<L>  |  51<H>  ----------------------------<  98  5.2<H>   |  18  |  1.8<H>    Ca    9.5      08 Mar 2022 15:10  Mg     1.9         TPro  7.3  /  Alb  4.6  /  TBili  0.6  /  DBili  x   /  AST  23  /  ALT  32  /  AlkPhos  53        Urinalysis Basic - ( 08 Mar 2022 17:22 )    Color: Colorless / Appearance: Clear / S.020 / pH: x  Gluc: x / Ketone: Negative  / Bili: Negative / Urobili: <2 mg/dL   Blood: x / Protein: Negative / Nitrite: Negative   Leuk Esterase: Negative / RBC: x / WBC x   Sq Epi: x / Non Sq Epi: x / Bacteria: x        Troponin T, Serum: <0.01 ng/mL (22 @ 15:11)  Troponin T, Serum: <0.01 ng/mL (22 @ 12:41)    [id number]  CARDIAC MARKERS ( 08 Mar 2022 15:11 )  x     / <0.01 ng/mL / x     / x     / x      CARDIAC MARKERS ( 08 Mar 2022 12:41 )  x     / <0.01 ng/mL / x     / x     / x          < from: CT Angio Neck w/ IV Cont (22 @ 14:47) >    CT HEAD:  No acute intracranial pathology.    CTA HEAD/NECK:  No large vessel occlusion, aneurysm or vascular malformation.    Effacement of the left piriform sinus without evidence of discrete mass.    Redemonstrated superficial subcutaneous cystic lesion measuring 1.7 cm in   the mid posterior neck could reflect a sebaceous cyst versus inclusion   cyst.    < end of copied text >    < from: CT Abdomen and Pelvis No Cont (22 @ 18:12) >      IMPRESSION:    Delayed nephrograms bilaterally compatible with nonspecific nephropathy.    No hydronephrosis bilaterally or obstructing ureteral calculus.    Contrast is seen within the calyces bilaterally limiting assessment for   renal calculi.    < end of copied text >

## 2022-03-08 NOTE — ED PROVIDER NOTE - CLINICAL SUMMARY MEDICAL DECISION MAKING FREE TEXT BOX
56yoM with h/o traumatic central cord syndrome now resolved, baseline nystagmus, DM, HTN, presents with multiple complaints. Reports room-spinning dizziness x 3 wks, worsened with ambulation. Also having generalized back pain without focality x 2 wks, worsened today. 7AM today had midsternal chest heaviness nonradiating, some SOB as well. Denies fever, cough, LE edema, recent long distance travel, and all other symptoms. Seen by PMD for this dizziness, then neuro and had carotid duplex yesterday and other testing planned and unknown results for this. On exam, afebrile, hemodynamically stable, saturating well, NAD, well appearing, sitting comfortably in bed, no WOB, speaking full sentences, head NCAT, pupils equal, EOMI, anicteric, MMM, no JVD, RRR, nml S1/S2, no m/r/g, lungs CTAB, no w/r/r, abd soft, NT, ND, nml BS, no rebound or guarding, no CVAT, AAO, CN's 3-12 intact, motor 5/5 and sens symm in all extrems, DAMON spontaneously, appearance of PVD with dry skin, skin warm. Character low suspicion for CVA and no focal or localizing findings. No arrhythmia or e/o aortic outflow obstruction. No anemia or bleeding. Character low suspicion for dissection and CTA negative for this. Character low suspicion for PE and no e/o DVT or tachycardia/hypoxia. Noted YOANNA. Patient well appearing, hemodynamically stable, no WOB. Admitted to internal medicine for further monitoring, w/u, and care. Signout from Dr. Alvares labs reviewed will admit

## 2022-03-08 NOTE — ED PROVIDER NOTE - CARE PLAN
Principal Discharge DX:	Chest pain, rule out acute myocardial infarction  Secondary Diagnosis:	YOANNA (acute kidney injury)  Secondary Diagnosis:	Dizziness   1

## 2022-03-08 NOTE — H&P ADULT - NSHPPHYSICALEXAM_GEN_ALL_CORE
PHYSICAL EXAM:  GENERAL: NAD, lying in bed comfortably  HEAD:  Atraumatic, Normocephalic  EYES: EOMI, PERRLA, conjunctiva and sclera clear  ENT: dry mucous membranes  CHEST/LUNG: Clear to auscultation bilaterally; No rales, rhonchi, wheezing, or rubs. Unlabored respirations  HEART: Regular rate and rhythm; No murmurs, rubs, or gallops  ABDOMEN: oft, Nontender, Nondistended.  NERVOUS SYSTEM:  Alert & Oriented X3, speech clear. No deficits   MSK: FROM all 4 extremities, full and equal strength

## 2022-03-08 NOTE — ED PROVIDER NOTE - NS ED ROS FT
Review of Systems:  	•	CONSTITUTIONAL - no fever, no diaphoresis, no chills  	•	SKIN - no rash  	•	HEMATOLOGIC - no bleeding, no bruising  	•	EYES - no eye pain, no blurry vision  	•	ENT - no congestion  	•	RESPIRATORY - +shortness of breath, no cough  	•	CARDIAC - +chest pain, no palpitations  	•	GI - no abd pain, no nausea, no vomiting, no diarrhea, no constipation  	•	GENITO-URINARY - no dysuria; no hematuria, no increased urinary frequency  	•	MUSCULOSKELETAL - no joint paint, no swelling, no redness  	•	NEUROLOGIC - +dizziness, no weakness, no headache, no paresthesias, no LOC  	•	PSYCH - no anxiety, no depression  	All other ROS are negative except as documented in HPI.

## 2022-03-08 NOTE — H&P ADULT - ASSESSMENT
Mr. Jones, 56 year old M with PMHx of traumatic central cord syndrome now resolved, baseline nystagmus, DM, HTN, presents with multiple complaints (dizziness, generalized weakness, chest pressure).     #atypical midsteral chest heaviness, pluritic most likely 2/2 to MSK, dissection ruled out in ED   - midsternal chest heaviness nonradiating, worse on inspiration since today, intermittent   - In the ED, vitals BP: 134/65 HR:77 RR18 spo2 100% on RA, 96.7 f.  - Labs significant for Na 129, creat 1.9 (baseline 0.8-1), K 5.2, trop negative x2; dissection ruled on in ED on imaging.  - EKG no ischemic changes   - f/u A1c/lipid profile/TSH  - f/u echo  - pain control prn    #YOANNA most likely prerenal 2/2 to generalized weakness  - for Na 129, creat 1.9 (baseline 0.8-1)  -CT Abdomen/Pelvis: Delayed nephrograms bilaterally compatible with nonspecific nephropathy. No hydronephrosis bilaterally or obstructing ureteral calculus.   - c/w with gentle hydration  - f/u urine lytes  - trend BMP    #dizziness/difficulty ambulating/generalized weakness  -CTA head/neck: No large vessel occlusion, aneurysm or vascular malformation. Effacement of the left piriform sinus without evidence of discrete mass. Redemonstrated superficial subcutaneous cystic lesion measuring 1.7 cm in the mid posterior neck could reflect a sebaceous cyst versus inclusion cyst.   - c/w with gentle hydration  - pain control prn  - meclizine prn  - if no improvement with meclizine consider neuro consult  - PT eval    #DM2  - monitor BS and start insulin sliding scale  - start basal insulin and mealtime insulin if needed  - last hba1c- 6.2     #hx left peroneal DVT  - Duplex at [facility] on 1/14 was negative for new DVT, It showed chronic thrombus unchanged from previous scan  - c/w Xarelto    Diet: carb consistent   Activity: as tolerated  DVT Prophylaxis: xarelto  GI Prophylaxis: protonix  Code Status: full  Disposition: acute

## 2022-03-09 ENCOUNTER — TRANSCRIPTION ENCOUNTER (OUTPATIENT)
Age: 57
End: 2022-03-09

## 2022-03-09 VITALS
SYSTOLIC BLOOD PRESSURE: 134 MMHG | OXYGEN SATURATION: 98 % | HEART RATE: 79 BPM | DIASTOLIC BLOOD PRESSURE: 72 MMHG | RESPIRATION RATE: 18 BRPM

## 2022-03-09 LAB
A1C WITH ESTIMATED AVERAGE GLUCOSE RESULT: 6.6 % — HIGH (ref 4–5.6)
ALBUMIN SERPL ELPH-MCNC: 4.7 G/DL — SIGNIFICANT CHANGE UP (ref 3.5–5.2)
ALP SERPL-CCNC: 53 U/L — SIGNIFICANT CHANGE UP (ref 30–115)
ALT FLD-CCNC: 33 U/L — SIGNIFICANT CHANGE UP (ref 0–41)
ANION GAP SERPL CALC-SCNC: 12 MMOL/L — SIGNIFICANT CHANGE UP (ref 7–14)
AST SERPL-CCNC: 22 U/L — SIGNIFICANT CHANGE UP (ref 0–41)
BASOPHILS # BLD AUTO: 0.05 K/UL — SIGNIFICANT CHANGE UP (ref 0–0.2)
BASOPHILS NFR BLD AUTO: 0.5 % — SIGNIFICANT CHANGE UP (ref 0–1)
BILIRUB SERPL-MCNC: 0.5 MG/DL — SIGNIFICANT CHANGE UP (ref 0.2–1.2)
BUN SERPL-MCNC: 42 MG/DL — HIGH (ref 10–20)
CALCIUM SERPL-MCNC: 9.6 MG/DL — SIGNIFICANT CHANGE UP (ref 8.5–10.1)
CHLORIDE SERPL-SCNC: 100 MMOL/L — SIGNIFICANT CHANGE UP (ref 98–110)
CHOLEST SERPL-MCNC: 148 MG/DL — SIGNIFICANT CHANGE UP
CO2 SERPL-SCNC: 23 MMOL/L — SIGNIFICANT CHANGE UP (ref 17–32)
CREAT SERPL-MCNC: 1.6 MG/DL — HIGH (ref 0.7–1.5)
CULTURE RESULTS: SIGNIFICANT CHANGE UP
EGFR: 51 ML/MIN/1.73M2 — LOW
EOSINOPHIL # BLD AUTO: 0.16 K/UL — SIGNIFICANT CHANGE UP (ref 0–0.7)
EOSINOPHIL NFR BLD AUTO: 1.7 % — SIGNIFICANT CHANGE UP (ref 0–8)
ESTIMATED AVERAGE GLUCOSE: 143 MG/DL — HIGH (ref 68–114)
GLUCOSE BLDC GLUCOMTR-MCNC: 123 MG/DL — HIGH (ref 70–99)
GLUCOSE BLDC GLUCOMTR-MCNC: 98 MG/DL — SIGNIFICANT CHANGE UP (ref 70–99)
GLUCOSE SERPL-MCNC: 115 MG/DL — HIGH (ref 70–99)
HCT VFR BLD CALC: 44.9 % — SIGNIFICANT CHANGE UP (ref 42–52)
HDLC SERPL-MCNC: 33 MG/DL — LOW
HGB BLD-MCNC: 14.9 G/DL — SIGNIFICANT CHANGE UP (ref 14–18)
IMM GRANULOCYTES NFR BLD AUTO: 0.2 % — SIGNIFICANT CHANGE UP (ref 0.1–0.3)
LIPID PNL WITH DIRECT LDL SERPL: 99 MG/DL — SIGNIFICANT CHANGE UP
LYMPHOCYTES # BLD AUTO: 2.39 K/UL — SIGNIFICANT CHANGE UP (ref 1.2–3.4)
LYMPHOCYTES # BLD AUTO: 25.6 % — SIGNIFICANT CHANGE UP (ref 20.5–51.1)
MAGNESIUM SERPL-MCNC: 2 MG/DL — SIGNIFICANT CHANGE UP (ref 1.8–2.4)
MCHC RBC-ENTMCNC: 30.2 PG — SIGNIFICANT CHANGE UP (ref 27–31)
MCHC RBC-ENTMCNC: 33.2 G/DL — SIGNIFICANT CHANGE UP (ref 32–37)
MCV RBC AUTO: 91.1 FL — SIGNIFICANT CHANGE UP (ref 80–94)
MONOCYTES # BLD AUTO: 0.95 K/UL — HIGH (ref 0.1–0.6)
MONOCYTES NFR BLD AUTO: 10.2 % — HIGH (ref 1.7–9.3)
NEUTROPHILS # BLD AUTO: 5.78 K/UL — SIGNIFICANT CHANGE UP (ref 1.4–6.5)
NEUTROPHILS NFR BLD AUTO: 61.8 % — SIGNIFICANT CHANGE UP (ref 42.2–75.2)
NON HDL CHOLESTEROL: 115 MG/DL — SIGNIFICANT CHANGE UP
NRBC # BLD: 0 /100 WBCS — SIGNIFICANT CHANGE UP (ref 0–0)
PLATELET # BLD AUTO: 198 K/UL — SIGNIFICANT CHANGE UP (ref 130–400)
POTASSIUM SERPL-MCNC: 5.2 MMOL/L — HIGH (ref 3.5–5)
POTASSIUM SERPL-SCNC: 5.2 MMOL/L — HIGH (ref 3.5–5)
PROT SERPL-MCNC: 7.3 G/DL — SIGNIFICANT CHANGE UP (ref 6–8)
RBC # BLD: 4.93 M/UL — SIGNIFICANT CHANGE UP (ref 4.7–6.1)
RBC # FLD: 12.4 % — SIGNIFICANT CHANGE UP (ref 11.5–14.5)
SARS-COV-2 RNA SPEC QL NAA+PROBE: SIGNIFICANT CHANGE UP
SODIUM SERPL-SCNC: 135 MMOL/L — SIGNIFICANT CHANGE UP (ref 135–146)
SPECIMEN SOURCE: SIGNIFICANT CHANGE UP
TRIGL SERPL-MCNC: 106 MG/DL — SIGNIFICANT CHANGE UP
TROPONIN T SERPL-MCNC: <0.01 NG/ML — SIGNIFICANT CHANGE UP
TSH SERPL-MCNC: 1.48 UIU/ML — SIGNIFICANT CHANGE UP (ref 0.27–4.2)
WBC # BLD: 9.35 K/UL — SIGNIFICANT CHANGE UP (ref 4.8–10.8)
WBC # FLD AUTO: 9.35 K/UL — SIGNIFICANT CHANGE UP (ref 4.8–10.8)

## 2022-03-09 PROCEDURE — 99239 HOSP IP/OBS DSCHRG MGMT >30: CPT

## 2022-03-09 RX ORDER — SODIUM CHLORIDE 9 MG/ML
1000 INJECTION INTRAMUSCULAR; INTRAVENOUS; SUBCUTANEOUS
Refills: 0 | Status: DISCONTINUED | OUTPATIENT
Start: 2022-03-09 | End: 2022-03-09

## 2022-03-09 RX ORDER — ACETAMINOPHEN 500 MG
650 TABLET ORAL EVERY 6 HOURS
Refills: 0 | Status: DISCONTINUED | OUTPATIENT
Start: 2022-03-09 | End: 2022-03-09

## 2022-03-09 RX ORDER — MECLIZINE HCL 12.5 MG
1 TABLET ORAL
Qty: 120 | Refills: 0
Start: 2022-03-09 | End: 2022-04-07

## 2022-03-09 RX ORDER — MECLIZINE HCL 12.5 MG
25 TABLET ORAL EVERY 6 HOURS
Refills: 0 | Status: DISCONTINUED | OUTPATIENT
Start: 2022-03-09 | End: 2022-03-09

## 2022-03-09 RX ORDER — ENOXAPARIN SODIUM 100 MG/ML
40 INJECTION SUBCUTANEOUS EVERY 24 HOURS
Refills: 0 | Status: DISCONTINUED | OUTPATIENT
Start: 2022-03-09 | End: 2022-03-09

## 2022-03-09 RX ORDER — METHOCARBAMOL 500 MG/1
1 TABLET, FILM COATED ORAL
Qty: 5 | Refills: 0
Start: 2022-03-09 | End: 2022-03-13

## 2022-03-09 RX ADMIN — SODIUM CHLORIDE 75 MILLILITER(S): 9 INJECTION INTRAMUSCULAR; INTRAVENOUS; SUBCUTANEOUS at 00:41

## 2022-03-09 NOTE — PHYSICAL THERAPY INITIAL EVALUATION ADULT - ADDITIONAL COMMENTS
Patient lives in a multilevel home and has 4 steps to enter. Was independent in ADL's and ambulation without assistive device. Patient still working as a .

## 2022-03-09 NOTE — PHYSICAL THERAPY INITIAL EVALUATION ADULT - PERTINENT HX OF CURRENT PROBLEM, REHAB EVAL
Mr. Jones, 56 year old M with PMHx of traumatic central cord syndrome now resolved, baseline nystagmus, DM, HTN, presents with multiple complaints. Patient reports room-spinning dizziness x 3 wks, worsened with ambulation. Also having generalized back pain without focality x 2 wks, worsened today. Since 7AM today had midsternal chest heaviness non-radiating, worse on inspiration

## 2022-03-09 NOTE — DISCHARGE NOTE NURSING/CASE MANAGEMENT/SOCIAL WORK - NSDCPEFALRISK_GEN_ALL_CORE
For information on Fall & Injury Prevention, visit: [email or web address] OR  [email or web address] OR  https://www.cdc.gov/steadi/patient.html

## 2022-03-09 NOTE — DISCHARGE NOTE PROVIDER - HOSPITAL COURSE
Mr. Jones, 56 year old M with PMHx of traumatic central cord syndrome now resolved, baseline nystagmus, DM, HTN, presents with multiple complaints. Patient reports room-spinning dizziness x 3 wks, worsened with ambulation. Also having generalized back pain without focality x 2 wks, worsened today. Since 7AM today had midsternal chest heaviness nonradiating, worse on inspiration Denies fever, cough, LE edema, recent long distance travel, and all other symptoms.     In the ED, vitals BP: 134/65 HR:77 RR18 spo2 100% on RA, 96.7 f. Labs significant for Na 129, creat 1.9 (baseline 0.8-1), K 5.2, trop negative x2. CT Abdomen/Pelvis: Delayed nephrograms bilaterally compatible with nonspecific nephropathy. No hydronephrosis bilaterally or obstructing ureteral calculus. CTA head/neck: No large vessel occlusion, aneurysm or vascular malformation.Effacement of the left piriform sinus without evidence of discrete mass. Redemonstrated superficial subcutaneous cystic lesion measuring 1.7 cm in the mid posterior neck could reflect a sebaceous cyst versus inclusion cyst. CXR: No radiographic evidence of acute cardiopulmonary disease. Patient given LR 1 L bolus and meclizine in the ED. Currently admitted to medicine for further workup.      #atypical midsteral chest heaviness, pluritic most likely 2/2 to MSK, dissection ruled out in ED   - midsternal chest heaviness nonradiating, worse on inspiration since today, intermittent   - In the ED, vitals BP: 134/65 HR:77 RR18 spo2 100% on RA, 96.7 f.  - Labs significant for Na 129, creat 1.9 (baseline 0.8-1), K 5.2, trop negative x2; dissection ruled on in ED on imaging.  - EKG no ischemic changes   - f/u echo  - pain control prn    #YOANNA most likely prerenal 2/2 to generalized weakness  - for Na 129, creat 1.9>1.6 (baseline 0.8-1)  -CT Abdomen/Pelvis: Delayed nephrograms bilaterally compatible with nonspecific nephropathy. No hydronephrosis bilaterally or obstructing ureteral calculus.   -s/p hydration  - trend BMP    #dizziness/difficulty ambulating/generalized weakness  -CTA head/neck: No large vessel occlusion, aneurysm or vascular malformation. Effacement of the left piriform sinus without evidence of discrete mass. Redemonstrated superficial subcutaneous cystic lesion measuring 1.7 cm in the mid posterior neck could reflect a sebaceous cyst versus inclusion cyst.   - pain control prn  - meclizine prn  - if no improvement with meclizine consider neuro consult  - PT eval    #DM2  - monitor BS and start insulin sliding scale  - start basal insulin and mealtime insulin if needed  - last hba1c- 6.2     #hx left peroneal DVT  - Duplex at [facility] on 1/14 was negative for new DVT, It showed chronic thrombus unchanged from previous scan  - c/w Xarelto Mr. Jones, 56 year old M with PMHx of traumatic central cord syndrome now resolved, baseline nystagmus(congenital), DM, HTN, presents with multiple complaints.   Patient reports room-spinning dizziness x 3 wks. On and off episodes lasting seconds to minutes. Also having generalized back pain without focality x 2 wks, worsened today. Since 7AM today had upper chest heaviness non radiating, worse on inspiration Denies fever, cough, LE edema, recent long distance travel, and all other symptoms.     In the ED, vitals BP: 134/65 HR:77 RR18 spo2 100% on RA, 96.7 f. Labs significant for Na 129, creat 1.9 (baseline 0.8-1), K 5.2, trop negative x2.   CT Abdomen/Pelvis: Delayed nephrograms bilaterally compatible with nonspecific nephropathy. No hydronephrosis bilaterally or obstructing ureteral calculus.   CTA head/neck: No large vessel occlusion, aneurysm or vascular malformation.Effacement of the left piriform sinus without evidence of discrete mass. Redemonstrated superficial subcutaneous cystic lesion measuring 1.7 cm in the mid posterior neck could reflect a sebaceous cyst versus inclusion cyst.   CXR: No radiographic evidence of acute cardiopulmonary disease.     Patient given LR 1 L bolus and meclizine in the ED. Following which patient' symptoms was better    A&P    # dizziness- due to BPPV  continue meclizine  fall precautions; driving restrictions until symptoms resolves discussed with patient. He verbalized understanding.  If not improving follow up with ENT physician occupational therapy as outpatient.    # Back pain in paraspinal  muscle area - non specific;  no complaints of any new neurological symptoms. Discussed with patient regarding neurological warning signs and to seek immediate medical care if develops  - including muscle weakness, paresthesia, bladder or bowel incontinence  - tylenol PRN, voltran gel; hot compress; robaxin before bedtime. Discussed with patient if dizziness is worsened with muscle relaxants not to continue medications further    # atypical upper chest heaviness- possibly musculoskeletal- resolved  ACS ruled out.  patient was able to ambulate with out any symptoms    #YOANNA most likely prerenal - resolved with hydration  -CT Abdomen/Pelvis: Delayed nephrograms bilaterally compatible with nonspecific nephropathy. No hydronephrosis bilaterally or obstructing ureteral calculus.   avoid NSAIDS  follow up with PCP for BMP monitoring    #DM2  - monitor BS and start insulin sliding scale  - start basal insulin and mealtime insulin if needed  - last hba1c- 6.2     #hx left peroneal DVT in 2019  patient off anticoagulation for 2 years

## 2022-03-09 NOTE — PHYSICAL THERAPY INITIAL EVALUATION ADULT - GENERAL OBSERVATIONS, REHAB EVAL
Patient encountered lying in bed. Reports no dizziness, chest pain or back pain at rest. Agreed to participate in therapy.

## 2022-03-09 NOTE — DISCHARGE NOTE PROVIDER - ATTENDING DISCHARGE PHYSICAL EXAMINATION:
PHYSICAL EXAM  GEN: no distress, comfortable  PULM: BS heard b/l equal, No wheezing  CVS: S1S2 present, no rubs or gallops  EXT: No lower extremity edema  NEURO: A&Ox3, moving all extremities

## 2022-03-09 NOTE — PHYSICAL THERAPY INITIAL EVALUATION ADULT - GAIT DEVIATIONS NOTED, PT EVAL
Antalgic gait due to tightness on low back. Patient reports 2/10 pain on low back on ambulation which progresses to 7/10 on prolonged ambulation

## 2022-03-09 NOTE — PHYSICAL THERAPY INITIAL EVALUATION ADULT - PLANNED THERAPY INTERVENTIONS, PT EVAL
No PT intervention needed at this time. Patient indepednent in ADL's and ambulation without assistive device. Recommend out patient PT for low back pain. Contact PT if status changes..

## 2022-03-09 NOTE — ED ADULT NURSE REASSESSMENT NOTE - NS ED NURSE REASSESS COMMENT FT1
patient discharged home. all discharge instructions given to and reviewed with patient. patient verbalizes understanding. v/s stable. iv access removed.

## 2022-03-09 NOTE — CHART NOTE - NSCHARTNOTEFT_GEN_A_CORE
Pt refused Xarelto. As per Pt he hasn't taken Xarelto in over 2 yrs.    Pt has a chronic Left Peroneal thrombus which is unchanged since last scanned in January.    Will sign out to primary team for need of AC for chronic unchanged superficial thrombus s/p Xarelto therapy in the past

## 2022-03-09 NOTE — DISCHARGE NOTE PROVIDER - NSDCFUSCHEDAPPT_GEN_ALL_CORE_FT
OCTAVIA GRAY ; 03/15/2022 ; NPP Cardio [address] OCT[address] ; 05/16/2022 ; NPP Endocrin [address]

## 2022-03-09 NOTE — DISCHARGE NOTE PROVIDER - NSDCCPCAREPLAN_GEN_ALL_CORE_FT
PRINCIPAL DISCHARGE DIAGNOSIS  Diagnosis: Chest pain, rule out acute myocardial infarction  Assessment and Plan of Treatment: Chest pain was rulled out for cardiac causes and now it is resolved so you are stable for d/c      SECONDARY DISCHARGE DIAGNOSES  Diagnosis: YOANNA (acute kidney injury)  Assessment and Plan of Treatment: you had mild YOANNA and you were given gentle hydsration and encourage oral hydration. f/u with PCP for labs in 1-2 weeksw     PRINCIPAL DISCHARGE DIAGNOSIS  Diagnosis: Dizziness  Assessment and Plan of Treatment: dizziness- possibly due to BPPV( benign paroxysmal positional vertigo  continue meclizine as needed  fall precautions; driving restrictions until symptoms resolves  If not improving follow up with ENT physician as outpatient        SECONDARY DISCHARGE DIAGNOSES  Diagnosis: YOANNA (acute kidney injury)  Assessment and Plan of Treatment:   #YOANNA improving with hydration  -CT Abdomen/Pelvis: Delayed nephrograms bilaterally compatible with nonspecific nephropathy. No hydronephrosis bilaterally or obstructing ureteral calculus.   Do not take NSAIDS(medications like advil(ibuprofen), aleve (naproxen))  follow up with PCP for monitoring renal function in 3-5 days.    Diagnosis: Chest pain  Assessment and Plan of Treatment: # atypical upper chest heaviness- possibly musculoskeletal- resolved  If developing any further chest pain seem immediate medical care    Diagnosis: Back pain  Assessment and Plan of Treatment: # Back pain in paraspinal  muscle area - non specific;  If devloeping any neurological signs- including muscle weakness, paresthesia, bladder or bowel incontinence seek immediate medical care  - You can continue tylenol PRN, voltran gel; hot compress; robaxin before bedtime.   if dizziness is worsened with muscle relaxants, then do not  continue medications further

## 2022-03-09 NOTE — DISCHARGE NOTE NURSING/CASE MANAGEMENT/SOCIAL WORK - PATIENT PORTAL LINK FT
You can access the FollowMyHealth Patient Portal offered by [facility] by registering at the following website: [email or web address]. By joining [employer]’s FollowMyHealth portal, you will also be able to view your health information using other applications (apps) compatible with our system.

## 2022-03-09 NOTE — ED ADULT NURSE REASSESSMENT NOTE - NS ED NURSE REASSESS COMMENT FT1
spoke with md [id number] regarding patient concern for home meds metformin and ramipril. md states will hold meds due to YOANNA and ct with contrast. pt verbalizes understanding

## 2022-03-09 NOTE — DISCHARGE NOTE PROVIDER - CARE PROVIDER_API CALL
Nory Stroud  Internal Medicine  [address] [place] [address]  Phone: [phone]  Fax: [phone]  Follow Up Time: 2 weeks

## 2022-03-12 DIAGNOSIS — R07.89 OTHER CHEST PAIN: ICD-10-CM

## 2022-03-12 DIAGNOSIS — I82.552 CHRONIC EMBOLISM AND THROMBOSIS OF LEFT PERONEAL VEIN: ICD-10-CM

## 2022-03-12 DIAGNOSIS — G83.89 OTHER SPECIFIED PARALYTIC SYNDROMES: ICD-10-CM

## 2022-03-12 DIAGNOSIS — Z79.84 LONG TERM (CURRENT) USE OF ORAL HYPOGLYCEMIC DRUGS: ICD-10-CM

## 2022-03-12 DIAGNOSIS — Z90.49 ACQUIRED ABSENCE OF OTHER SPECIFIED PARTS OF DIGESTIVE TRACT: ICD-10-CM

## 2022-03-12 DIAGNOSIS — N17.9 ACUTE KIDNEY FAILURE, UNSPECIFIED: ICD-10-CM

## 2022-03-12 DIAGNOSIS — R42 DIZZINESS AND GIDDINESS: ICD-10-CM

## 2022-03-12 DIAGNOSIS — M54.9 DORSALGIA, UNSPECIFIED: ICD-10-CM

## 2022-03-12 DIAGNOSIS — I10 ESSENTIAL (PRIMARY) HYPERTENSION: ICD-10-CM

## 2022-03-12 DIAGNOSIS — E11.9 TYPE 2 DIABETES MELLITUS WITHOUT COMPLICATIONS: ICD-10-CM

## 2022-03-12 DIAGNOSIS — R07.9 CHEST PAIN, UNSPECIFIED: ICD-10-CM

## 2022-03-12 DIAGNOSIS — H55.00 UNSPECIFIED NYSTAGMUS: ICD-10-CM

## 2022-03-12 DIAGNOSIS — Z83.3 FAMILY HISTORY OF DIABETES MELLITUS: ICD-10-CM

## 2022-03-12 DIAGNOSIS — R22.1 LOCALIZED SWELLING, MASS AND LUMP, NECK: ICD-10-CM

## 2022-03-15 ENCOUNTER — APPOINTMENT (OUTPATIENT)
Dept: CARDIOLOGY | Facility: CLINIC | Age: 57
End: 2022-03-15
Payer: COMMERCIAL

## 2022-03-15 VITALS — SYSTOLIC BLOOD PRESSURE: 120 MMHG | DIASTOLIC BLOOD PRESSURE: 80 MMHG | RESPIRATION RATE: 18 BRPM | HEART RATE: 72 BPM

## 2022-03-15 VITALS — BODY MASS INDEX: 38.17 KG/M2 | WEIGHT: 288 LBS | HEIGHT: 73 IN

## 2022-03-15 PROCEDURE — 93000 ELECTROCARDIOGRAM COMPLETE: CPT

## 2022-03-15 PROCEDURE — 99204 OFFICE O/P NEW MOD 45 MIN: CPT

## 2022-03-15 RX ORDER — METFORMIN HYDROCHLORIDE 625 MG/1
TABLET ORAL
Refills: 0 | Status: DISCONTINUED | COMMUNITY
End: 2022-03-15

## 2022-03-15 RX ORDER — METFORMIN HYDROCHLORIDE 1000 MG/1
1000 TABLET, COATED ORAL TWICE DAILY
Qty: 60 | Refills: 5 | Status: DISCONTINUED | COMMUNITY
Start: 2021-05-18 | End: 2022-03-15

## 2022-04-26 ENCOUNTER — APPOINTMENT (OUTPATIENT)
Dept: CARDIOLOGY | Facility: CLINIC | Age: 57
End: 2022-04-26
Payer: COMMERCIAL

## 2022-04-26 PROCEDURE — 93000 ELECTROCARDIOGRAM COMPLETE: CPT

## 2022-04-26 PROCEDURE — 99214 OFFICE O/P EST MOD 30 MIN: CPT

## 2022-04-26 PROCEDURE — 93306 TTE W/DOPPLER COMPLETE: CPT

## 2022-05-27 ENCOUNTER — APPOINTMENT (OUTPATIENT)
Dept: ENDOCRINOLOGY | Facility: CLINIC | Age: 57
End: 2022-05-27
Payer: COMMERCIAL

## 2022-05-27 VITALS
DIASTOLIC BLOOD PRESSURE: 78 MMHG | SYSTOLIC BLOOD PRESSURE: 128 MMHG | BODY MASS INDEX: 36.58 KG/M2 | TEMPERATURE: 97.2 F | HEART RATE: 70 BPM | OXYGEN SATURATION: 98 % | HEIGHT: 73 IN | WEIGHT: 276 LBS

## 2022-05-27 PROCEDURE — 99213 OFFICE O/P EST LOW 20 MIN: CPT

## 2022-05-27 RX ORDER — BLOOD SUGAR DIAGNOSTIC
STRIP MISCELLANEOUS
Qty: 3 | Refills: 2 | Status: ACTIVE | COMMUNITY
Start: 2022-05-27 | End: 1900-01-01

## 2022-05-27 NOTE — ASSESSMENT
[Diabetes Foot Care] : diabetes foot care [Long Term Vascular Complications] : long term vascular complications of diabetes [Carbohydrate Consistent Diet] : carbohydrate consistent diet [Importance of Diet and Exercise] : importance of diet and exercise to improve glycemic control, achieve weight loss and improve cardiovascular health [Exercise/Effect on Glucose] : exercise/effect on glucose [Hypoglycemia Management] : hypoglycemia management [Self Monitoring of Blood Glucose] : self monitoring of blood glucose [Retinopathy Screening] : Patient was referred to ophthalmology for retinopathy screening [Weight Loss] : weight loss [FreeTextEntry1] : Mr. OCTAVIA GRAY  Is  a 56 year  old male  who presented for follow up evaluation of type 2 Diabetes/ obesity \par \par \par #Controlled type 2 DM  \par 5/2022 : HBa1c 5.7% on  trulicity 1. 5 mg Q week , lost weight and watching diet , off metformin for almost 1 month and prefer to stay off \par - LDL above target , hesitant to start statin  despite also cardiology work up  , he will agree if numbers higher next BW \par  - f/u opthalmology and podiatry uptodate \par - on Ace inh \par \par labs and f/u in 6 months \par

## 2022-05-27 NOTE — HISTORY OF PRESENT ILLNESS
[FreeTextEntry1] : Mr. OCTAVIA GRAY  Is  a 56 year  old male  who presented for follow up evaluation of type 2 Diabetes:\par \par \par Diagnosis in 2018 \par Current Regimen: Metformin 1000 mg 1 tablet BID ( stopped it 1 months ago )  ,  Trulicity 1.5  mg Q week  \par Previous regimens: none \par Compliance:  good \par SMBG: daily in am 120-140 \par Hypoglycemia:  none, no symptoms too  \par Polyuria/polydipsia : better\par Weight change/BMI: lost 12 lbs \par Diet: 2-3 meals/day watching diet \par Exercise: walking on days off, and work days constantly moving and active \par HBa1c trend: ...5.7% ( 5/2022) 6.4% (11/2021) ...6% (2/2021) \par \par \par Prevention \par Statin: none \par ACE/ARB : ramipril 10 mg daily \par Eye examination: not done recently \par Neuropathy: follows with podiatry and vascular had previous left DVT and wear compression stocking , also has left Hammer toe \par PIERO: negative \par  no CAD no stroke , had event of possible CAD , had investigations with Dr tristan was ok \par \par

## 2022-05-27 NOTE — DATA REVIEWED
[FreeTextEntry1] : 2/2021: HBa1c 6%, alb/crea 11 LDL 89 Tg 95  crea 1.03  tsh 1.59\par 11/6/21: HBA1c 6.4% AST 39 ALT 31 K 6 GFR 80 crea 1.05     TSH 1.53  FT4 1.2\par 5/2022: A1c 5.7% glucose 114   Tg 100 crea 1.27  GFR 63  AST 24  ALT 30 alb/crea negative tsh 1.86  ft4 1.2

## 2022-05-27 NOTE — REVIEW OF SYSTEMS
[Lower Ext Edema] : lower extremity edema [Polyuria] : polyuria [Fatigue] : no fatigue [Recent Weight Gain (___ Lbs)] : no recent weight gain [Recent Weight Loss (___ Lbs)] : recent weight loss: [unfilled] lbs [Fever] : no fever [Blurred Vision] : no blurred vision [Dysphagia] : no dysphagia [Neck Pain] : no neck pain [Dysphonia] : no dysphonia [Chest Pain] : no chest pain [Slow Heart Rate] : heart rate is not slow [Palpitations] : no palpitations [Shortness Of Breath] : no shortness of breath [Cough] : no cough [SOB on Exertion] : no shortness of breath on exertion [Nausea] : no nausea [Constipation] : no constipation [Vomiting] : no vomiting [Diarrhea] : no diarrhea [Nocturia] : no nocturia [Hesistancy] : no hesitancy [Muscle Weakness] : no muscle weakness [Myalgia] : no myalgia  [Headaches] : no headaches [Dizziness] : no dizziness [Tremors] : no tremors [Polydipsia] : no polydipsia [Cold Intolerance] : no cold intolerance [Heat Intolerance] : no heat intolerance

## 2022-05-27 NOTE — PHYSICAL EXAM
[Alert] : alert [Well Nourished] : well nourished [Healthy Appearance] : healthy appearance [Obese] : obese [No Acute Distress] : no acute distress [No Proptosis] : no proptosis [No Lid Lag] : no lid lag [Thyroid Not Enlarged] : the thyroid was not enlarged [No Thyroid Nodules] : no palpable thyroid nodules [No Accessory Muscle Use] : no accessory muscle use [Clear to Auscultation] : lungs were clear to auscultation bilaterally [Normal S1, S2] : normal S1 and S2 [No Murmurs] : no murmurs [Regular Rhythm] : with a regular rhythm [Not Tender] : non-tender [Not Distended] : not distended [Soft] : abdomen soft [No Tremors] : no tremors [Oriented x3] : oriented to person, place, and time [No Stigmata of Cushings Syndrome] : no stigmata of Cushings Syndrome [Acanthosis Nigricans] : no acanthosis nigricans [de-identified] : + edema, chronic skin changes

## 2022-06-06 ENCOUNTER — APPOINTMENT (OUTPATIENT)
Dept: CARDIOLOGY | Facility: CLINIC | Age: 57
End: 2022-06-06
Payer: COMMERCIAL

## 2022-06-06 VITALS — RESPIRATION RATE: 18 BRPM | HEART RATE: 64 BPM | DIASTOLIC BLOOD PRESSURE: 80 MMHG | SYSTOLIC BLOOD PRESSURE: 128 MMHG

## 2022-06-06 VITALS — BODY MASS INDEX: 36.58 KG/M2 | HEIGHT: 73 IN | WEIGHT: 276 LBS

## 2022-06-06 DIAGNOSIS — I10 ESSENTIAL (PRIMARY) HYPERTENSION: ICD-10-CM

## 2022-06-06 DIAGNOSIS — R94.31 ABNORMAL ELECTROCARDIOGRAM [ECG] [EKG]: ICD-10-CM

## 2022-06-06 PROCEDURE — 99214 OFFICE O/P EST MOD 30 MIN: CPT

## 2022-06-06 PROCEDURE — 93000 ELECTROCARDIOGRAM COMPLETE: CPT

## 2022-09-08 NOTE — DISCHARGE NOTE NURSING/CASE MANAGEMENT/SOCIAL WORK - NSDPDISTO_GEN_ALL_CORE
Home Advancement-Rotation Flap Text: The defect edges were debeveled with a #15 scalpel blade.  Given the location of the defect, shape of the defect and the proximity to free margins an advancement-rotation flap was deemed most appropriate.  Using a sterile surgical marker, an appropriate flap was drawn incorporating the defect and placing the expected incisions within the relaxed skin tension lines where possible. The area thus outlined was incised deep to adipose tissue with a #15 scalpel blade.  The skin margins were undermined to an appropriate distance in all directions utilizing iris scissors.

## 2022-12-06 ENCOUNTER — APPOINTMENT (OUTPATIENT)
Dept: ENDOCRINOLOGY | Facility: CLINIC | Age: 57
End: 2022-12-06

## 2022-12-06 PROCEDURE — 99441: CPT

## 2022-12-06 NOTE — PHYSICAL EXAM
[Alert] : alert [Well Nourished] : well nourished [Healthy Appearance] : healthy appearance [Obese] : obese [No Acute Distress] : no acute distress [No Proptosis] : no proptosis [No Lid Lag] : no lid lag [Thyroid Not Enlarged] : the thyroid was not enlarged [No Thyroid Nodules] : no palpable thyroid nodules [No Accessory Muscle Use] : no accessory muscle use [Clear to Auscultation] : lungs were clear to auscultation bilaterally [Normal S1, S2] : normal S1 and S2 [No Murmurs] : no murmurs [Regular Rhythm] : with a regular rhythm [Not Tender] : non-tender [Not Distended] : not distended [Soft] : abdomen soft [No Stigmata of Cushings Syndrome] : no stigmata of Cushings Syndrome [Acanthosis Nigricans] : no acanthosis nigricans [No Tremors] : no tremors [Oriented x3] : oriented to person, place, and time [de-identified] : + edema, chronic skin changes

## 2022-12-06 NOTE — ASSESSMENT
[Diabetes Foot Care] : diabetes foot care [Long Term Vascular Complications] : long term vascular complications of diabetes [Carbohydrate Consistent Diet] : carbohydrate consistent diet [Importance of Diet and Exercise] : importance of diet and exercise to improve glycemic control, achieve weight loss and improve cardiovascular health [Exercise/Effect on Glucose] : exercise/effect on glucose [Hypoglycemia Management] : hypoglycemia management [Self Monitoring of Blood Glucose] : self monitoring of blood glucose [Retinopathy Screening] : Patient was referred to ophthalmology for retinopathy screening [Weight Loss] : weight loss [FreeTextEntry1] : Mr. OCTAVIA GRAY  Is  a 57 year  old male   for follow up evaluation of type 2 Diabetes/ obesity (phone visit to review labs ) \par \par \par #well Controlled type 2 DM  / DL / obesity \par 10/2022 : HBa1c 5.2% on  trulicity 1. 5 mg Q week , lost weight and watching diet , prefer to stay off metformin \par - LDL improved with diet and exercise not on statin and  hesitant to use  statin  \par - continue ramipril 10 mg daily , BP and GFR ok \par  - f/u opthalmology and podiatry uptodate \par - continue with diet and weight loss \par \par labs and f/u in 6 months \par

## 2022-12-06 NOTE — DATA REVIEWED
[FreeTextEntry1] : 2/2021: HBa1c 6%, alb/crea 11 LDL 89 Tg 95  crea 1.03  tsh 1.59\par 11/6/21: HBA1c 6.4% AST 39 ALT 31 K 6 GFR 80 crea 1.05     TSH 1.53  FT4 1.2\par 5/2022: A1c 5.7% glucose 114   Tg 100 crea 1.27  GFR 63  AST 24  ALT 30 alb/crea negative tsh 1.86  ft4 1.2\par \par 10/2022:A1c 5.2 %  glucose 88 crea 1.18   GFR 73 LDL 80  Tg 130 TSh 2.56  Ft4 1.1

## 2022-12-06 NOTE — REASON FOR VISIT
[Follow - Up] : a follow-up visit [DM Type 2] : DM Type 2 [Weight Management/Obesity] : weight management/obesity [Other Location: e.g. School (Enter Location, City,State)___] : at [unfilled], at the time of the visit. [Medical Office: ([facility])___] : at the medical office located in  [Verbal consent obtained from patient] : the patient, [unfilled]

## 2022-12-06 NOTE — HISTORY OF PRESENT ILLNESS
[FreeTextEntry1] : Mr. OCTAVIA GRAY  Is  a 57 year  old male   for follow up evaluation of type 2 Diabetes: ( phone visit ) \par \par \par Diagnosis in 2018 \par Current Regimen:  Trulicity 1.5  mg Q week  \par Previous regimens: metformin \par Compliance:  good \par SMBG: daily in am  at target \par Hypoglycemia:  none, no symptoms too  \par Polyuria/polydipsia : better\par Weight change/BMI: lost more weight  \par Diet: 2-3 meals/day watching diet \par Exercise: walking on days off, and work days constantly moving and active \par HBa1c trend: 5.2% ( 10/2022)  ...5.7% ( 5/2022) 6.4% (11/2021) ...6% (2/2021) \par \par \par Prevention \par Statin: none LDL 80 now \par ACE/ARB : ramipril 10 mg daily \par Eye examination: ok\par Neuropathy: follows with podiatry and vascular had previous left DVT and wear compression stocking , also has left Hammer toe \par PIERO: negative \par  no CAD no stroke , had event of possible CAD , had investigations with Dr tristan was ok \par \par

## 2022-12-06 NOTE — QUALITY MEASURES
Statement Selected
[Visual inspection, sensory exam] : Foot exam, including visual inspection, sensory exam with mono filament, and pulse exam, was performed within the last 12 months

## 2022-12-06 NOTE — REVIEW OF SYSTEMS
[Fatigue] : no fatigue [Recent Weight Gain (___ Lbs)] : no recent weight gain [Recent Weight Loss (___ Lbs)] : recent weight loss: [unfilled] lbs [Fever] : no fever [Blurred Vision] : no blurred vision [Dysphagia] : no dysphagia [Neck Pain] : no neck pain [Dysphonia] : no dysphonia [Chest Pain] : no chest pain [Slow Heart Rate] : heart rate is not slow [Palpitations] : no palpitations [Lower Ext Edema] : no lower extremity edema [Shortness Of Breath] : no shortness of breath [Cough] : no cough [SOB on Exertion] : no shortness of breath on exertion [Nausea] : no nausea [Constipation] : no constipation [Vomiting] : no vomiting [Diarrhea] : no diarrhea [Polyuria] : no polyuria [Nocturia] : no nocturia [Hesistancy] : no hesitancy [Muscle Weakness] : no muscle weakness [Myalgia] : no myalgia  [Headaches] : no headaches [Dizziness] : no dizziness [Tremors] : no tremors [Polydipsia] : no polydipsia [Cold Intolerance] : no cold intolerance [Heat Intolerance] : no heat intolerance

## 2023-02-02 ENCOUNTER — APPOINTMENT (OUTPATIENT)
Dept: CARDIOLOGY | Facility: CLINIC | Age: 58
End: 2023-02-02

## 2023-05-22 ENCOUNTER — RX RENEWAL (OUTPATIENT)
Age: 58
End: 2023-05-22

## 2023-06-13 NOTE — ED ADULT NURSE NOTE - EXTENSIONS OF SELF_ADULT
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 Measures 1.7 cm in greatest dimension on US. MRI measures 2.3cm in greatest dimension. Palpable axillary adenopathy with benign pathology and  PET scan without evidence of distant disease. Left kelsey partial mastectomy, J-mammoplasty, SLNB on 06/02/2023. \par \par We reviewed post-op expectations and recovery. I explained to her that she is healing well overall without evidence of infection. There is a palpable seroma in the left axilla and we discussed the option of aspiration as well as risk vs benefit, she would prefer to have the seroma aspirated at this time. Aspiration preformed with ultrasound roughly 50 cc of serous fluid aspirated and discarded. \par \par  We reviewed her pathology in detail including size of cancer, surgical margins, and lymph node status. I explained to her that from a breast standpoint we are complete and margins are negative. We discussed the complexity of her lymph node status and options for return to OR for axillary dissection vs proceed with breast and dylan radiation. Additionally, the case was discussed at our Tumor Board and proceeding with radiation and no further surgery was favored. We discussed the risk of lymphedema, and the associated morbidity in the setting of ALND and radiation vs radiation alone. She elected to proceed with radiation. Additionally, I will send her to Dr. Rojas for lymphedema surveillance and SOZO followup. \par \par Additionally we discussed systemic therapy including endocrine therapy. We discussed the role of oncotype and chemotherapy, which would be decided by Dr. Lopez, but during our TB discussion, this was not favored. \par \par  We will obtain her next baseline mammogram 6 months after completion of radiation. I will see her back in 6 months for her next CBE.\par \par \par Patient verbalized understanding of all treatment plans. All of her questions were answered to the best of my ability. She was encouraged to call the office for any questions or concerns sooner \par \par \par Plan:\par 1. Followup med onc\par 2. Followup rad onc\par 3. PMR/SOZO referral\par 4. Followup in 6 months \par \par 
None

## 2023-11-09 ENCOUNTER — APPOINTMENT (OUTPATIENT)
Dept: ORTHOPEDIC SURGERY | Facility: CLINIC | Age: 58
End: 2023-11-09
Payer: COMMERCIAL

## 2023-11-09 VITALS — BODY MASS INDEX: 40.63 KG/M2 | HEIGHT: 72 IN | WEIGHT: 300 LBS

## 2023-11-09 PROCEDURE — 99203 OFFICE O/P NEW LOW 30 MIN: CPT | Mod: 25

## 2023-11-09 PROCEDURE — 20610 DRAIN/INJ JOINT/BURSA W/O US: CPT | Mod: 50

## 2023-11-09 PROCEDURE — 73562 X-RAY EXAM OF KNEE 3: CPT | Mod: 50

## 2023-12-07 ENCOUNTER — RX RENEWAL (OUTPATIENT)
Age: 58
End: 2023-12-07

## 2024-01-04 ENCOUNTER — APPOINTMENT (OUTPATIENT)
Dept: ORTHOPEDIC SURGERY | Facility: CLINIC | Age: 59
End: 2024-01-04
Payer: COMMERCIAL

## 2024-01-04 VITALS — WEIGHT: 300 LBS | BODY MASS INDEX: 39.76 KG/M2 | HEIGHT: 73 IN

## 2024-01-04 DIAGNOSIS — M17.0 BILATERAL PRIMARY OSTEOARTHRITIS OF KNEE: ICD-10-CM

## 2024-01-04 PROCEDURE — 99213 OFFICE O/P EST LOW 20 MIN: CPT

## 2024-01-04 PROCEDURE — 99203 OFFICE O/P NEW LOW 30 MIN: CPT

## 2024-01-04 NOTE — HISTORY OF PRESENT ILLNESS
[de-identified] : Patient here for evaluation bilateral knee pain. Denies instability Pain with ambulation and stairs  had cortisone injections last visit, is doing well  NAD bilateral knee No skin breakdown Tricompartmental TTP Positive patella grind PF crepitus Negative lachman Negative varus/valgus instability ROM 0-110 Pain with forced extension and flexion NVI Compartments soft and NT  Xray reviewed and significant for bilateral knee arthritis, adv medial comp  Plan went over findings explained the xrays since doing well will cont cons tx pt, hep, pain control as needed fu in 4-6m, or sooner if pain returns

## 2024-01-05 ENCOUNTER — RX RENEWAL (OUTPATIENT)
Age: 59
End: 2024-01-05

## 2024-01-05 RX ORDER — DICLOFENAC SODIUM 75 MG/1
75 TABLET, DELAYED RELEASE ORAL
Qty: 60 | Refills: 0 | Status: ACTIVE | COMMUNITY
Start: 2023-11-09 | End: 1900-01-01

## 2024-01-24 RX ORDER — TIRZEPATIDE 5 MG/.5ML
5 INJECTION, SOLUTION SUBCUTANEOUS
Qty: 3 | Refills: 1 | Status: ACTIVE | COMMUNITY
Start: 2024-01-24 | End: 1900-01-01

## 2024-02-29 ENCOUNTER — APPOINTMENT (OUTPATIENT)
Dept: ENDOCRINOLOGY | Facility: CLINIC | Age: 59
End: 2024-02-29
Payer: COMMERCIAL

## 2024-02-29 VITALS
HEIGHT: 73 IN | SYSTOLIC BLOOD PRESSURE: 135 MMHG | WEIGHT: 306 LBS | HEART RATE: 68 BPM | BODY MASS INDEX: 40.56 KG/M2 | DIASTOLIC BLOOD PRESSURE: 90 MMHG | OXYGEN SATURATION: 98 %

## 2024-02-29 DIAGNOSIS — E78.5 HYPERLIPIDEMIA, UNSPECIFIED: ICD-10-CM

## 2024-02-29 DIAGNOSIS — E11.9 TYPE 2 DIABETES MELLITUS W/OUT COMPLICATIONS: ICD-10-CM

## 2024-02-29 DIAGNOSIS — E66.9 OBESITY, UNSPECIFIED: ICD-10-CM

## 2024-02-29 PROCEDURE — 99214 OFFICE O/P EST MOD 30 MIN: CPT

## 2024-02-29 NOTE — HISTORY OF PRESENT ILLNESS
[FreeTextEntry1] : Mr. OCTAVIA GRAY  Is  a 58 year  old male   for follow up evaluation of type 2 Diabetes: not seen for > 1 year     Diagnosis in 2018  Current Regimen:  Mounjaro  5 mg Q week ( started d on 1/24/25) after he missed 1 month on trulicity due to backorder , has been requiring steroids for bilateral knee pain  Previous regimens: metformin  Compliance:  good  SMBG: am low 100s, was in high 100s before mounajro  Hypoglycemia:  none, no symptoms too   Polyuria/polydipsia : yes Weight change/BMI: gained  Diet: 2-3 meals/day watching diet  Exercise: less active now due to knee pain   HBa1c trend: 5.2% ( 10/2022)  ...5.7% ( 5/2022) 6.4% (11/2021) ...6% (2/2021)...9.2%( 2/2024)     Prevention  Statin: none  now  ACE/ARB : ramipril 10 mg daily  Eye examination: ok Neuropathy: follows with podiatry and vascular had previous left DVT and wear compression stocking , also has left Hammer toe  PIERO: negative   no CAD no stroke , had event of possible CAD , had investigations with Dr Villarreal was ok

## 2024-02-29 NOTE — ASSESSMENT
[Diabetes Foot Care] : diabetes foot care [Long Term Vascular Complications] : long term vascular complications of diabetes [Carbohydrate Consistent Diet] : carbohydrate consistent diet [Importance of Diet and Exercise] : importance of diet and exercise to improve glycemic control, achieve weight loss and improve cardiovascular health [Hypoglycemia Management] : hypoglycemia management [Exercise/Effect on Glucose] : exercise/effect on glucose [Self Monitoring of Blood Glucose] : self monitoring of blood glucose [Retinopathy Screening] : Patient was referred to ophthalmology for retinopathy screening [Weight Loss] : weight loss [FreeTextEntry1] : Mr. OCTAVIA GRAY  Is  a 58 year  old male   for follow up evaluation of type 2 Diabetes/ obesity   #well Controlled type 2 DM  / DL / obesity  2/2024: A1c is up to 9.2% was off trulicity for 1month due to back order now on mounajro 5 mg Q week for 1 month SMBG low 100s  - continue same with Mounjaro 5 mg Q week and if no major side effects then will up it  - LDL higher now ,  not on statin and  hesitant to use  statin   - continue ramipril 10 mg daily , BP and GFR ok , check ACR , had proteine trace   - f/u opthalmology and podiatry  - continue with diet and weight loss

## 2024-02-29 NOTE — DATA REVIEWED
[FreeTextEntry1] : 2/2021: HBa1c 6%, alb/crea 11 LDL 89 Tg 95  crea 1.03  tsh 1.59 11/6/21: HBA1c 6.4% AST 39 ALT 31 K 6 GFR 80 crea 1.05     TSH 1.53  FT4 1.2 5/2022: A1c 5.7% glucose 114   Tg 100 crea 1.27  GFR 63  AST 24  ALT 30 alb/crea negative tsh 1.86  ft4 1.2  10/2022:A1c 5.2 %  glucose 88 crea 1.18   GFR 73 LDL 80  Tg 130 TSh 2.56  Ft4 1.1    2/2024: A1c 9.2%   ldl 119  tg 151  glucose 202 crea 1.3  gfr 64 TSH 2.43  Ft4 1  TSI negative b12 331 25 vit D 14

## 2024-02-29 NOTE — PHYSICAL EXAM
[Alert] : alert [Healthy Appearance] : healthy appearance [Obese] : obese [No Acute Distress] : no acute distress [No Proptosis] : no proptosis [No Lid Lag] : no lid lag [Thyroid Not Enlarged] : the thyroid was not enlarged [No Respiratory Distress] : no respiratory distress [No Accessory Muscle Use] : no accessory muscle use [Clear to Auscultation] : lungs were clear to auscultation bilaterally [Normal S1, S2] : normal S1 and S2 [No Murmurs] : no murmurs [Regular Rhythm] : with a regular rhythm [No Stigmata of Cushings Syndrome] : no stigmata of Cushings Syndrome [No Edema] : no peripheral edema [Oriented x3] : oriented to person, place, and time [No Tremors] : no tremors

## 2024-02-29 NOTE — REVIEW OF SYSTEMS
[Fatigue] : no fatigue [Recent Weight Gain (___ Lbs)] : recent weight gain: [unfilled] lbs [Recent Weight Loss (___ Lbs)] : no recent weight loss [Fever] : no fever [Blurred Vision] : no blurred vision [Dysphagia] : no dysphagia [Neck Pain] : no neck pain [Dysphonia] : no dysphonia [Chest Pain] : no chest pain [Slow Heart Rate] : heart rate is not slow [Palpitations] : no palpitations [Lower Ext Edema] : no lower extremity edema [Shortness Of Breath] : no shortness of breath [Cough] : no cough [SOB on Exertion] : no shortness of breath on exertion [Nausea] : no nausea [Constipation] : no constipation [Vomiting] : no vomiting [Diarrhea] : no diarrhea [Polyuria] : no polyuria [Nocturia] : no nocturia [Hesistancy] : no hesitancy [As Noted in HPI] : as noted in HPI [Joint Pain] : joint pain [Muscle Weakness] : no muscle weakness [Myalgia] : no myalgia  [Headaches] : no headaches [Dizziness] : no dizziness [Tremors] : no tremors [Polydipsia] : no polydipsia [Cold Intolerance] : no cold intolerance [Heat Intolerance] : no heat intolerance [FreeTextEntry9] : knees

## 2024-04-03 NOTE — REASON FOR VISIT
Sunscreen Recommendations: SPF 30 or greater (I.e. Cerave, Cetaphil, La roche posay, Elta MD) Detail Level: Detailed Moisturizer Recommendations: Cerave or Amlactin [Follow - Up] : a follow-up visit Detail Level: Zone [DM Type 2] : DM Type 2 [Weight Management/Obesity] : weight management/obesity

## 2024-04-30 RX ORDER — TIRZEPATIDE 7.5 MG/.5ML
7.5 INJECTION, SOLUTION SUBCUTANEOUS
Qty: 1 | Refills: 5 | Status: ACTIVE | COMMUNITY
Start: 2024-04-26 | End: 1900-01-01

## 2024-06-06 RX ORDER — DULAGLUTIDE 1.5 MG/.5ML
1.5 INJECTION, SOLUTION SUBCUTANEOUS
Qty: 6 | Refills: 0 | Status: DISCONTINUED | COMMUNITY
Start: 2021-11-15 | End: 2024-06-06

## 2024-06-06 RX ORDER — DULAGLUTIDE 0.75 MG/.5ML
0.75 INJECTION, SOLUTION SUBCUTANEOUS
Qty: 3 | Refills: 0 | Status: DISCONTINUED | COMMUNITY
Start: 2021-05-18 | End: 2024-06-06

## 2024-07-09 ENCOUNTER — APPOINTMENT (OUTPATIENT)
Dept: ORTHOPEDIC SURGERY | Facility: CLINIC | Age: 59
End: 2024-07-09

## 2024-07-10 ENCOUNTER — APPOINTMENT (OUTPATIENT)
Dept: ENDOCRINOLOGY | Facility: CLINIC | Age: 59
End: 2024-07-10
Payer: COMMERCIAL

## 2024-07-10 VITALS
SYSTOLIC BLOOD PRESSURE: 130 MMHG | HEIGHT: 73 IN | TEMPERATURE: 97 F | WEIGHT: 302.38 LBS | DIASTOLIC BLOOD PRESSURE: 90 MMHG | BODY MASS INDEX: 40.08 KG/M2

## 2024-07-10 DIAGNOSIS — E78.5 HYPERLIPIDEMIA, UNSPECIFIED: ICD-10-CM

## 2024-07-10 DIAGNOSIS — I10 ESSENTIAL (PRIMARY) HYPERTENSION: ICD-10-CM

## 2024-07-10 DIAGNOSIS — E66.9 OBESITY, UNSPECIFIED: ICD-10-CM

## 2024-07-10 DIAGNOSIS — R42 DIZZINESS AND GIDDINESS: ICD-10-CM

## 2024-07-10 DIAGNOSIS — E11.9 TYPE 2 DIABETES MELLITUS W/OUT COMPLICATIONS: ICD-10-CM

## 2024-07-10 PROCEDURE — 99213 OFFICE O/P EST LOW 20 MIN: CPT

## 2024-08-30 ENCOUNTER — APPOINTMENT (OUTPATIENT)
Dept: OTOLARYNGOLOGY | Facility: CLINIC | Age: 59
End: 2024-08-30
Payer: COMMERCIAL

## 2024-08-30 DIAGNOSIS — R42 DIZZINESS AND GIDDINESS: ICD-10-CM

## 2024-08-30 DIAGNOSIS — H55.00 UNSPECIFIED NYSTAGMUS: ICD-10-CM

## 2024-08-30 PROCEDURE — 92550 TYMPANOMETRY & REFLEX THRESH: CPT | Mod: 52

## 2024-08-30 PROCEDURE — 99204 OFFICE O/P NEW MOD 45 MIN: CPT

## 2024-08-30 PROCEDURE — 92557 COMPREHENSIVE HEARING TEST: CPT

## 2024-08-30 NOTE — HISTORY OF PRESENT ILLNESS
[de-identified] : Patient presents today for c/o dizziness.  He states this happens randomly and his episodes last for about an hour a time and happens 3-4 times a day. He feels off balance and out of focus. It's worse in bigger rooms. Symptoms have been ongoing for 4 years. He has a HX of paralysis and astigmatism. No further complaints.

## 2024-08-30 NOTE — ASSESSMENT
[FreeTextEntry1] : Neurology referral.  Recommend MRI IAC w/ bmp.  Refer for [facility]  Follow up with me right after.

## 2024-10-29 ENCOUNTER — APPOINTMENT (OUTPATIENT)
Dept: OTOLARYNGOLOGY | Facility: CLINIC | Age: 59
End: 2024-10-29
Payer: COMMERCIAL

## 2024-10-29 VITALS — HEIGHT: 73 IN | WEIGHT: 302 LBS | BODY MASS INDEX: 40.02 KG/M2

## 2024-10-29 DIAGNOSIS — R42 DIZZINESS AND GIDDINESS: ICD-10-CM

## 2024-10-29 PROCEDURE — 92540 BASIC VESTIBULAR EVALUATION: CPT

## 2024-10-29 PROCEDURE — 92547 SUPPLEMENTAL ELECTRICAL TEST: CPT

## 2024-10-29 PROCEDURE — 99213 OFFICE O/P EST LOW 20 MIN: CPT

## 2024-10-29 PROCEDURE — 92537 CALORIC VSTBLR TEST W/REC: CPT

## 2024-11-07 ENCOUNTER — NON-APPOINTMENT (OUTPATIENT)
Age: 59
End: 2024-11-07

## 2024-11-07 ENCOUNTER — APPOINTMENT (OUTPATIENT)
Dept: NEUROLOGY | Facility: CLINIC | Age: 59
End: 2024-11-07
Payer: COMMERCIAL

## 2024-11-07 VITALS
DIASTOLIC BLOOD PRESSURE: 82 MMHG | SYSTOLIC BLOOD PRESSURE: 139 MMHG | HEIGHT: 73 IN | BODY MASS INDEX: 40.02 KG/M2 | WEIGHT: 302 LBS | HEART RATE: 62 BPM

## 2024-11-07 VITALS — HEIGHT: 73 IN | OXYGEN SATURATION: 97 % | BODY MASS INDEX: 39.84 KG/M2

## 2024-11-07 DIAGNOSIS — Z86.39 PERSONAL HISTORY OF OTHER ENDOCRINE, NUTRITIONAL AND METABOLIC DISEASE: ICD-10-CM

## 2024-11-07 DIAGNOSIS — G43.909 MIGRAINE, UNSPECIFIED, NOT INTRACTABLE, W/OUT STATUS MIGRAINOSUS: ICD-10-CM

## 2024-11-07 DIAGNOSIS — H55.00 UNSPECIFIED NYSTAGMUS: ICD-10-CM

## 2024-11-07 DIAGNOSIS — Z87.828 PERSONAL HISTORY OF OTHER (HEALED) PHYSICAL INJURY AND TRAUMA: ICD-10-CM

## 2024-11-07 PROCEDURE — 99203 OFFICE O/P NEW LOW 30 MIN: CPT

## 2024-11-21 ENCOUNTER — OUTPATIENT (OUTPATIENT)
Dept: OUTPATIENT SERVICES | Facility: HOSPITAL | Age: 59
LOS: 1 days | End: 2024-11-21
Payer: COMMERCIAL

## 2024-11-21 ENCOUNTER — RESULT REVIEW (OUTPATIENT)
Age: 59
End: 2024-11-21

## 2024-11-21 DIAGNOSIS — Z98.890 OTHER SPECIFIED POSTPROCEDURAL STATES: Chronic | ICD-10-CM

## 2024-11-21 DIAGNOSIS — G43.909 MIGRAINE, UNSPECIFIED, NOT INTRACTABLE, WITHOUT STATUS MIGRAINOSUS: ICD-10-CM

## 2024-11-21 DIAGNOSIS — Z00.8 ENCOUNTER FOR OTHER GENERAL EXAMINATION: ICD-10-CM

## 2024-11-21 DIAGNOSIS — Z90.49 ACQUIRED ABSENCE OF OTHER SPECIFIED PARTS OF DIGESTIVE TRACT: Chronic | ICD-10-CM

## 2024-11-21 PROCEDURE — 70552 MRI BRAIN STEM W/DYE: CPT | Mod: 26,76

## 2024-11-21 PROCEDURE — 70552 MRI BRAIN STEM W/DYE: CPT

## 2024-11-21 PROCEDURE — A9579: CPT

## 2024-11-22 DIAGNOSIS — G43.909 MIGRAINE, UNSPECIFIED, NOT INTRACTABLE, WITHOUT STATUS MIGRAINOSUS: ICD-10-CM

## 2024-11-26 ENCOUNTER — NON-APPOINTMENT (OUTPATIENT)
Age: 59
End: 2024-11-26

## 2024-12-17 ENCOUNTER — APPOINTMENT (OUTPATIENT)
Dept: OTOLARYNGOLOGY | Facility: CLINIC | Age: 59
End: 2024-12-17
Payer: COMMERCIAL

## 2024-12-17 VITALS — BODY MASS INDEX: 40.02 KG/M2 | WEIGHT: 302 LBS | HEIGHT: 73 IN

## 2024-12-17 DIAGNOSIS — R42 DIZZINESS AND GIDDINESS: ICD-10-CM

## 2024-12-17 DIAGNOSIS — H55.00 UNSPECIFIED NYSTAGMUS: ICD-10-CM

## 2024-12-17 PROCEDURE — 99214 OFFICE O/P EST MOD 30 MIN: CPT

## 2025-01-08 ENCOUNTER — NON-APPOINTMENT (OUTPATIENT)
Age: 60
End: 2025-01-08

## 2025-01-08 ENCOUNTER — APPOINTMENT (OUTPATIENT)
Dept: NEUROLOGY | Facility: CLINIC | Age: 60
End: 2025-01-08
Payer: COMMERCIAL

## 2025-01-08 VITALS
OXYGEN SATURATION: 97 % | WEIGHT: 301 LBS | BODY MASS INDEX: 39.89 KG/M2 | SYSTOLIC BLOOD PRESSURE: 152 MMHG | DIASTOLIC BLOOD PRESSURE: 82 MMHG | HEIGHT: 73 IN | HEART RATE: 72 BPM

## 2025-01-08 DIAGNOSIS — R42 DIZZINESS AND GIDDINESS: ICD-10-CM

## 2025-01-08 PROCEDURE — G2211 COMPLEX E/M VISIT ADD ON: CPT | Mod: NC

## 2025-01-08 PROCEDURE — 99213 OFFICE O/P EST LOW 20 MIN: CPT

## 2025-01-15 ENCOUNTER — OUTPATIENT (OUTPATIENT)
Dept: OUTPATIENT SERVICES | Facility: HOSPITAL | Age: 60
LOS: 1 days | End: 2025-01-15
Payer: COMMERCIAL

## 2025-01-15 DIAGNOSIS — H55.00 UNSPECIFIED NYSTAGMUS: ICD-10-CM

## 2025-01-15 DIAGNOSIS — R42 DIZZINESS AND GIDDINESS: ICD-10-CM

## 2025-01-15 DIAGNOSIS — Z90.49 ACQUIRED ABSENCE OF OTHER SPECIFIED PARTS OF DIGESTIVE TRACT: Chronic | ICD-10-CM

## 2025-01-15 DIAGNOSIS — Z98.890 OTHER SPECIFIED POSTPROCEDURAL STATES: Chronic | ICD-10-CM

## 2025-01-15 PROCEDURE — 97112 NEUROMUSCULAR REEDUCATION: CPT | Mod: GP

## 2025-01-15 PROCEDURE — 97162 PT EVAL MOD COMPLEX 30 MIN: CPT | Mod: GP

## 2025-01-16 DIAGNOSIS — H55.00 UNSPECIFIED NYSTAGMUS: ICD-10-CM

## 2025-01-16 DIAGNOSIS — R42 DIZZINESS AND GIDDINESS: ICD-10-CM

## 2025-01-22 ENCOUNTER — APPOINTMENT (OUTPATIENT)
Dept: ENDOCRINOLOGY | Facility: CLINIC | Age: 60
End: 2025-01-22
Payer: COMMERCIAL

## 2025-01-22 ENCOUNTER — OUTPATIENT (OUTPATIENT)
Dept: OUTPATIENT SERVICES | Facility: HOSPITAL | Age: 60
LOS: 1 days | End: 2025-01-22

## 2025-01-22 VITALS — OXYGEN SATURATION: 99 % | HEART RATE: 72 BPM

## 2025-01-22 VITALS
HEIGHT: 73 IN | OXYGEN SATURATION: 99 % | BODY MASS INDEX: 39.89 KG/M2 | WEIGHT: 301 LBS | HEART RATE: 78 BPM | SYSTOLIC BLOOD PRESSURE: 144 MMHG | DIASTOLIC BLOOD PRESSURE: 98 MMHG

## 2025-01-22 DIAGNOSIS — H55.00 UNSPECIFIED NYSTAGMUS: ICD-10-CM

## 2025-01-22 DIAGNOSIS — E78.5 HYPERLIPIDEMIA, UNSPECIFIED: ICD-10-CM

## 2025-01-22 DIAGNOSIS — R42 DIZZINESS AND GIDDINESS: ICD-10-CM

## 2025-01-22 DIAGNOSIS — Z90.49 ACQUIRED ABSENCE OF OTHER SPECIFIED PARTS OF DIGESTIVE TRACT: Chronic | ICD-10-CM

## 2025-01-22 DIAGNOSIS — E66.9 OBESITY, UNSPECIFIED: ICD-10-CM

## 2025-01-22 DIAGNOSIS — Z98.890 OTHER SPECIFIED POSTPROCEDURAL STATES: Chronic | ICD-10-CM

## 2025-01-22 DIAGNOSIS — E11.9 TYPE 2 DIABETES MELLITUS W/OUT COMPLICATIONS: ICD-10-CM

## 2025-01-22 PROCEDURE — 99213 OFFICE O/P EST LOW 20 MIN: CPT

## 2025-01-22 RX ORDER — TIRZEPATIDE 10 MG/.5ML
10 INJECTION, SOLUTION SUBCUTANEOUS
Qty: 3 | Refills: 1 | Status: ACTIVE | COMMUNITY
Start: 2025-01-22 | End: 1900-01-01

## 2025-01-29 ENCOUNTER — OUTPATIENT (OUTPATIENT)
Dept: OUTPATIENT SERVICES | Facility: HOSPITAL | Age: 60
LOS: 1 days | End: 2025-01-29

## 2025-01-29 DIAGNOSIS — R42 DIZZINESS AND GIDDINESS: ICD-10-CM

## 2025-01-29 DIAGNOSIS — H55.00 UNSPECIFIED NYSTAGMUS: ICD-10-CM

## 2025-01-29 DIAGNOSIS — Z90.49 ACQUIRED ABSENCE OF OTHER SPECIFIED PARTS OF DIGESTIVE TRACT: Chronic | ICD-10-CM

## 2025-01-29 DIAGNOSIS — Z98.890 OTHER SPECIFIED POSTPROCEDURAL STATES: Chronic | ICD-10-CM

## 2025-03-13 ENCOUNTER — APPOINTMENT (OUTPATIENT)
Dept: PULMONOLOGY | Facility: CLINIC | Age: 60
End: 2025-03-13
Payer: COMMERCIAL

## 2025-03-13 VITALS
BODY MASS INDEX: 39.76 KG/M2 | DIASTOLIC BLOOD PRESSURE: 76 MMHG | RESPIRATION RATE: 14 BRPM | HEIGHT: 73 IN | WEIGHT: 300 LBS | OXYGEN SATURATION: 98 % | HEART RATE: 72 BPM | SYSTOLIC BLOOD PRESSURE: 116 MMHG

## 2025-03-13 DIAGNOSIS — R09.82 POSTNASAL DRIP: ICD-10-CM

## 2025-03-13 DIAGNOSIS — G47.33 OBSTRUCTIVE SLEEP APNEA (ADULT) (PEDIATRIC): ICD-10-CM

## 2025-03-13 DIAGNOSIS — Z82.49 FAMILY HISTORY OF ISCHEMIC HEART DISEASE AND OTHER DISEASES OF THE CIRCULATORY SYSTEM: ICD-10-CM

## 2025-03-13 DIAGNOSIS — Z86.39 PERSONAL HISTORY OF OTHER ENDOCRINE, NUTRITIONAL AND METABOLIC DISEASE: ICD-10-CM

## 2025-03-13 DIAGNOSIS — E66.9 OBESITY, UNSPECIFIED: ICD-10-CM

## 2025-03-13 DIAGNOSIS — Z86.79 PERSONAL HISTORY OF OTHER DISEASES OF THE CIRCULATORY SYSTEM: ICD-10-CM

## 2025-03-13 DIAGNOSIS — Z83.3 FAMILY HISTORY OF DIABETES MELLITUS: ICD-10-CM

## 2025-03-13 PROCEDURE — 99204 OFFICE O/P NEW MOD 45 MIN: CPT

## 2025-03-13 PROCEDURE — G2211 COMPLEX E/M VISIT ADD ON: CPT | Mod: NC

## 2025-04-15 ENCOUNTER — OUTPATIENT (OUTPATIENT)
Dept: OUTPATIENT SERVICES | Facility: HOSPITAL | Age: 60
LOS: 1 days | Discharge: HOME | End: 2025-04-15
Payer: COMMERCIAL

## 2025-04-15 ENCOUNTER — APPOINTMENT (OUTPATIENT)
Dept: SLEEP CENTER | Facility: HOSPITAL | Age: 60
End: 2025-04-15

## 2025-04-15 DIAGNOSIS — Z98.890 OTHER SPECIFIED POSTPROCEDURAL STATES: Chronic | ICD-10-CM

## 2025-04-15 DIAGNOSIS — G47.33 OBSTRUCTIVE SLEEP APNEA (ADULT) (PEDIATRIC): ICD-10-CM

## 2025-04-15 DIAGNOSIS — Z90.49 ACQUIRED ABSENCE OF OTHER SPECIFIED PARTS OF DIGESTIVE TRACT: Chronic | ICD-10-CM

## 2025-04-15 PROCEDURE — 95810 POLYSOM 6/> YRS 4/> PARAM: CPT

## 2025-04-15 PROCEDURE — 95810 POLYSOM 6/> YRS 4/> PARAM: CPT | Mod: 26

## 2025-04-17 DIAGNOSIS — G47.33 OBSTRUCTIVE SLEEP APNEA (ADULT) (PEDIATRIC): ICD-10-CM

## 2025-05-02 ENCOUNTER — APPOINTMENT (OUTPATIENT)
Dept: OTOLARYNGOLOGY | Facility: CLINIC | Age: 60
End: 2025-05-02
Payer: COMMERCIAL

## 2025-05-02 ENCOUNTER — APPOINTMENT (OUTPATIENT)
Dept: PULMONOLOGY | Facility: CLINIC | Age: 60
End: 2025-05-02
Payer: COMMERCIAL

## 2025-05-02 VITALS — BODY MASS INDEX: 39.76 KG/M2 | HEIGHT: 73 IN | WEIGHT: 300 LBS

## 2025-05-02 DIAGNOSIS — G47.33 OBSTRUCTIVE SLEEP APNEA (ADULT) (PEDIATRIC): ICD-10-CM

## 2025-05-02 DIAGNOSIS — E66.9 OBESITY, UNSPECIFIED: ICD-10-CM

## 2025-05-02 DIAGNOSIS — R42 DIZZINESS AND GIDDINESS: ICD-10-CM

## 2025-05-02 DIAGNOSIS — L29.9 PRURITUS, UNSPECIFIED: ICD-10-CM

## 2025-05-02 PROCEDURE — 99214 OFFICE O/P EST MOD 30 MIN: CPT

## 2025-05-02 PROCEDURE — G2211 COMPLEX E/M VISIT ADD ON: CPT | Mod: NC,95

## 2025-05-02 PROCEDURE — 99213 OFFICE O/P EST LOW 20 MIN: CPT | Mod: 95

## 2025-05-02 RX ORDER — FLUOCINOLONE ACETONIDE 0.11 MG/ML
0.01 OIL AURICULAR (OTIC)
Qty: 1 | Refills: 3 | Status: ACTIVE | COMMUNITY
Start: 2025-05-02 | End: 1900-01-01

## 2025-05-13 ENCOUNTER — APPOINTMENT (OUTPATIENT)
Dept: PULMONOLOGY | Facility: CLINIC | Age: 60
End: 2025-05-13
Payer: COMMERCIAL

## 2025-05-13 PROCEDURE — 94727 GAS DIL/WSHOT DETER LNG VOL: CPT

## 2025-05-13 PROCEDURE — 94729 DIFFUSING CAPACITY: CPT

## 2025-05-13 PROCEDURE — 94010 BREATHING CAPACITY TEST: CPT

## 2025-06-19 ENCOUNTER — OUTPATIENT (OUTPATIENT)
Dept: OUTPATIENT SERVICES | Facility: HOSPITAL | Age: 60
LOS: 1 days | Discharge: HOME | End: 2025-06-19
Payer: COMMERCIAL

## 2025-06-19 ENCOUNTER — APPOINTMENT (OUTPATIENT)
Dept: SLEEP CENTER | Facility: HOSPITAL | Age: 60
End: 2025-06-19

## 2025-06-19 DIAGNOSIS — Z98.890 OTHER SPECIFIED POSTPROCEDURAL STATES: Chronic | ICD-10-CM

## 2025-06-19 DIAGNOSIS — Z90.49 ACQUIRED ABSENCE OF OTHER SPECIFIED PARTS OF DIGESTIVE TRACT: Chronic | ICD-10-CM

## 2025-06-19 DIAGNOSIS — G47.33 OBSTRUCTIVE SLEEP APNEA (ADULT) (PEDIATRIC): ICD-10-CM

## 2025-06-19 PROCEDURE — 95811 POLYSOM 6/>YRS CPAP 4/> PARM: CPT | Mod: 26

## 2025-06-19 PROCEDURE — 95811 POLYSOM 6/>YRS CPAP 4/> PARM: CPT

## 2025-06-21 DIAGNOSIS — G47.33 OBSTRUCTIVE SLEEP APNEA (ADULT) (PEDIATRIC): ICD-10-CM

## 2025-07-11 ENCOUNTER — APPOINTMENT (OUTPATIENT)
Dept: OTOLARYNGOLOGY | Facility: CLINIC | Age: 60
End: 2025-07-11

## 2025-07-15 ENCOUNTER — APPOINTMENT (OUTPATIENT)
Dept: ENDOCRINOLOGY | Facility: CLINIC | Age: 60
End: 2025-07-15
Payer: COMMERCIAL

## 2025-07-15 PROCEDURE — 99212 OFFICE O/P EST SF 10 MIN: CPT | Mod: 93

## 2025-07-16 ENCOUNTER — APPOINTMENT (OUTPATIENT)
Dept: PULMONOLOGY | Facility: CLINIC | Age: 60
End: 2025-07-16
Payer: COMMERCIAL

## 2025-07-16 VITALS
SYSTOLIC BLOOD PRESSURE: 130 MMHG | BODY MASS INDEX: 39.76 KG/M2 | RESPIRATION RATE: 15 BRPM | HEIGHT: 73 IN | WEIGHT: 300 LBS | OXYGEN SATURATION: 98 % | DIASTOLIC BLOOD PRESSURE: 74 MMHG | HEART RATE: 87 BPM

## 2025-07-16 PROCEDURE — 99214 OFFICE O/P EST MOD 30 MIN: CPT

## 2025-07-16 PROCEDURE — G2211 COMPLEX E/M VISIT ADD ON: CPT | Mod: NC
